# Patient Record
Sex: FEMALE | Race: WHITE | URBAN - METROPOLITAN AREA
[De-identification: names, ages, dates, MRNs, and addresses within clinical notes are randomized per-mention and may not be internally consistent; named-entity substitution may affect disease eponyms.]

---

## 2022-03-25 PROBLEM — Z34.81 MULTIGRAVIDA IN FIRST TRIMESTER: Status: ACTIVE | Noted: 2022-03-25

## 2022-03-25 PROBLEM — O09.891 H/O PRETERM DELIVERY, CURRENTLY PREGNANT, FIRST TRIMESTER: Status: ACTIVE | Noted: 2022-03-25

## 2022-03-29 PROBLEM — Z28.39 RUBELLA NON-IMMUNE STATUS, ANTEPARTUM: Status: ACTIVE | Noted: 2022-03-29

## 2022-03-29 PROBLEM — O09.899 RUBELLA NON-IMMUNE STATUS, ANTEPARTUM: Status: ACTIVE | Noted: 2022-03-29

## 2022-04-25 PROBLEM — O09.892 HISTORY OF PRETERM DELIVERY, CURRENTLY PREGNANT IN SECOND TRIMESTER: Status: ACTIVE | Noted: 2022-03-25

## 2022-04-25 PROBLEM — Z34.82 MULTIGRAVIDA IN SECOND TRIMESTER: Status: ACTIVE | Noted: 2022-03-25

## 2022-05-05 PROBLEM — O28.5 ABNORMAL GENETIC TEST DURING PREGNANCY: Status: ACTIVE | Noted: 2022-05-05

## 2022-05-23 ENCOUNTER — ROUTINE PRENATAL (OUTPATIENT)
Dept: OBGYN CLINIC | Age: 27
End: 2022-05-23

## 2022-05-23 VITALS
BODY MASS INDEX: 20.39 KG/M2 | WEIGHT: 122.4 LBS | DIASTOLIC BLOOD PRESSURE: 72 MMHG | TEMPERATURE: 98.8 F | HEIGHT: 65 IN | HEART RATE: 93 BPM | OXYGEN SATURATION: 98 % | SYSTOLIC BLOOD PRESSURE: 122 MMHG

## 2022-05-23 DIAGNOSIS — Z14.8 CARRIER OF SPINAL MUSCULAR ATROPHY: ICD-10-CM

## 2022-05-23 DIAGNOSIS — Z28.39 RUBELLA NON-IMMUNE STATUS, ANTEPARTUM: ICD-10-CM

## 2022-05-23 DIAGNOSIS — O09.892 HISTORY OF PRETERM DELIVERY, CURRENTLY PREGNANT IN SECOND TRIMESTER: ICD-10-CM

## 2022-05-23 DIAGNOSIS — O09.899 RUBELLA NON-IMMUNE STATUS, ANTEPARTUM: ICD-10-CM

## 2022-05-23 DIAGNOSIS — Z34.82 MULTIGRAVIDA IN SECOND TRIMESTER: ICD-10-CM

## 2022-05-23 PROCEDURE — 99213 OFFICE O/P EST LOW 20 MIN: CPT | Performed by: OBSTETRICS & GYNECOLOGY

## 2022-05-23 NOTE — PROGRESS NOTES
Chief Complaint   Patient presents with    Routine Prenatal Visit        This 27 y.o. Dougie Núñez at 20w0d with Estimated Date of Delivery: 10/10/22 presents for routine prenatal visit. Patient has some complaints today. Pt reports good FM, no LOF, VB, ctx. Pt denies H/A, vision changes, abdom pain, N/V. Patient reports 24 hours of all over malaise and body aches. Denies any fever or chills. 25month-old infant had viral illness last week. Recommended patient go to urgent care for flu and COVID swabs. Over-the-counter medication for relief    Vitals:    22 1442   BP: 122/72   Site: Left Upper Arm   Position: Sitting   Pulse: 93   Temp: 98.8 °F (37.1 °C)   SpO2: 98%   Weight: 122 lb 6.4 oz (55.5 kg)   Height: 5' 5\" (1.651 m)        Patient Active Problem List    Diagnosis Date Noted    Carrier of spinal muscular atrophy 2022     Overview Note:     2022: Offered FOB testing and genetic counseling         Assessment & Plan Note:     noted      Rubella non-immune status, antepartum 2022     Overview Note:     imm PP         Assessment & Plan Note:     noted      Multigravida in second trimester 2022     Overview Note:     EDC by LMP confirmed by 11 5/7 week US    2022: CF negative, NIPT wnl (neg x 3, female)         Assessment & Plan Note:     Educated patient of signs and symptoms of  labor including but not limited to regular uterine contractions every 5-7 minutes for 1 hour, vaginal bleeding or leakage of fluid to seek immediate care.  History of  delivery, currently pregnant in second trimester 2022     Overview Note:     G1  at 31 3/7 weeks    PLAN: routine CL starting at 16 weeks.  17-OHP at 16 - 36 weeks    22:  CL 3.1 cm, no funneling         Assessment & Plan Note:     noted         Problem List Items Addressed This Visit        Other    Rubella non-immune status, antepartum     noted         Multigravida in second trimester     Educated patient of signs and symptoms of  labor including but not limited to regular uterine contractions every 5-7 minutes for 1 hour, vaginal bleeding or leakage of fluid to seek immediate care.            History of  delivery, currently pregnant in second trimester     noted          Carrier of spinal muscular atrophy     noted                Tamy Mathur MD     3:11 PM    22

## 2022-05-23 NOTE — PATIENT INSTRUCTIONS
Please go to urgent care and have them swab you for flu and COVID  If you develop signs and symptoms of  labor including but not limited to regular uterine contractions every 5-7 minutes for 1 hour, vaginal bleeding or leakage of fluid please contact our office and/or seek immediate care. Thanks for coming to see us today and letting us take care of you!

## 2022-05-23 NOTE — PROGRESS NOTES
Patient comes in today for routine prenatal visit. Patient says that yesterday she started to experience stomach soreness like she bumped her belly. She says that she did not. She also mentions her back being achy and sore. She says she woke up this morning and her entire body was sore and she is still sore all over like all her joints and muscles.      Fetal Movements:  Yes  Contractions:  No  Vaginal Bleeding:  No  Leaking Fluid: No  GI/ issues: No    Vitals:    05/23/22 1442   BP: 122/72   Site: Left Upper Arm   Position: Sitting   Weight: 122 lb 6.4 oz (55.5 kg)   Height: 5' 5\" (1.651 m)           Poli Sanches MA  05/23/22  2:48 PM

## 2022-05-23 NOTE — ASSESSMENT & PLAN NOTE
Educated patient of signs and symptoms of  labor including but not limited to regular uterine contractions every 5-7 minutes for 1 hour, vaginal bleeding or leakage of fluid to seek immediate care.

## 2022-05-27 ENCOUNTER — TELEPHONE (OUTPATIENT)
Dept: OBGYN CLINIC | Age: 27
End: 2022-05-27

## 2022-05-27 NOTE — TELEPHONE ENCOUNTER
[employer] is the product that the patient is asking if it is safe to take while pregnant? She asks this because she feels like she needs a boost of energy in the middle of the day.

## 2022-05-31 ENCOUNTER — ROUTINE PRENATAL (OUTPATIENT)
Dept: OBGYN CLINIC | Age: 27
End: 2022-05-31
Payer: COMMERCIAL

## 2022-05-31 VITALS
SYSTOLIC BLOOD PRESSURE: 104 MMHG | WEIGHT: 123.8 LBS | HEIGHT: 65 IN | BODY MASS INDEX: 20.62 KG/M2 | DIASTOLIC BLOOD PRESSURE: 68 MMHG

## 2022-05-31 DIAGNOSIS — O09.899 RUBELLA NON-IMMUNE STATUS, ANTEPARTUM: ICD-10-CM

## 2022-05-31 DIAGNOSIS — R39.15 URINARY URGENCY: ICD-10-CM

## 2022-05-31 DIAGNOSIS — Z28.39 RUBELLA NON-IMMUNE STATUS, ANTEPARTUM: ICD-10-CM

## 2022-05-31 DIAGNOSIS — O09.892 HISTORY OF PRETERM DELIVERY, CURRENTLY PREGNANT IN SECOND TRIMESTER: ICD-10-CM

## 2022-05-31 DIAGNOSIS — Z36.89 ENCOUNTER FOR FETAL ANATOMIC SURVEY: Primary | ICD-10-CM

## 2022-05-31 DIAGNOSIS — Z34.82 MULTIGRAVIDA IN SECOND TRIMESTER: ICD-10-CM

## 2022-05-31 DIAGNOSIS — Z14.8 CARRIER OF SPINAL MUSCULAR ATROPHY: ICD-10-CM

## 2022-05-31 LAB
BILIRUBIN, URINE, POC: NEGATIVE
GLUCOSE URINE, POC: NEGATIVE
KETONES, URINE, POC: NEGATIVE
LEUKOCYTE ESTERASE, URINE, POC: NORMAL
NITRITE, URINE, POC: NEGATIVE
PH, URINE, POC: 6.5 (ref 4.6–8)
PROTEIN,URINE, POC: NEGATIVE
SPECIFIC GRAVITY, URINE, POC: 1.01 (ref 1–1.03)
URINALYSIS CLARITY, POC: CLEAR
URINALYSIS COLOR, POC: YELLOW

## 2022-05-31 PROCEDURE — 76817 TRANSVAGINAL US OBSTETRIC: CPT | Performed by: OBSTETRICS & GYNECOLOGY

## 2022-05-31 PROCEDURE — 81002 URINALYSIS NONAUTO W/O SCOPE: CPT | Performed by: OBSTETRICS & GYNECOLOGY

## 2022-05-31 PROCEDURE — 99213 OFFICE O/P EST LOW 20 MIN: CPT | Performed by: OBSTETRICS & GYNECOLOGY

## 2022-05-31 PROCEDURE — 76816 OB US FOLLOW-UP PER FETUS: CPT | Performed by: OBSTETRICS & GYNECOLOGY

## 2022-05-31 NOTE — PROGRESS NOTES
Patient comes in today for routine prenatal visit. No complaints/concerns today.     Fetal Movements:  Yes  Contractions:  No  Vaginal Bleeding:  No  Leaking Fluid: No  GI/ issues: No    Vitals:    05/31/22 1504   BP: 104/68   Site: Left Upper Arm   Position: Sitting   Weight: 123 lb 12.8 oz (56.2 kg)   Height: 5' 5\" (1.651 m)           Cristela Shook MA  05/31/22  3:07 PM

## 2022-05-31 NOTE — PROGRESS NOTES
Chief Complaint   Patient presents with    Routine Prenatal Visit        This 27 y.o. Farhad Bless at 21w1d with Estimated Date of Delivery: 10/10/22 presents for routine prenatal visit. Patient has no complaints today. Pt reports good FM, no LOF, VB, ctx. Pt denies H/A, vision changes, abdom pain, N/V. Vitals:    22 1504   BP: 104/68   Site: Left Upper Arm   Position: Sitting   Weight: 123 lb 12.8 oz (56.2 kg)   Height: 5' 5\" (1.651 m)        Patient Active Problem List    Diagnosis Date Noted    Carrier of spinal muscular atrophy 2022     Overview Note:     2022: Offered FOB testing and genetic counseling         Assessment & Plan Note:     noted      Rubella non-immune status, antepartum 2022     Overview Note:     imm PP         Assessment & Plan Note:     noted      Multigravida in second trimester 2022     Overview Note:     EDC by LMP confirmed by 11 5/7 week US    2022: CF negative, NIPT wnl (neg x 3, female)         Assessment & Plan Note:     Educated patient of signs and symptoms of  labor including but not limited to regular uterine contractions every 5-7 minutes for 1 hour, vaginal bleeding or leakage of fluid to seek immediate care.  History of  delivery, currently pregnant in second trimester 2022     Overview Note:     G1  at 31 3/7 weeks    PLAN: routine CL starting at 16 weeks. 17-OHP at 16 - 36 weeks    22:  CL 3.1 cm, no funneling  22:  CL 3.2 cm, no funneling         Assessment & Plan Note:     noted        Problem List Items Addressed This Visit        Other    Rubella non-immune status, antepartum     noted         Multigravida in second trimester     Educated patient of signs and symptoms of  labor including but not limited to regular uterine contractions every 5-7 minutes for 1 hour, vaginal bleeding or leakage of fluid to seek immediate care.           Relevant Orders    AMB POC US OB RE-EVAL/FOLLOW UP (Completed)    AMB POC US OB TRANSVAGINAL (Completed)    History of  delivery, currently pregnant in second trimester     noted         Relevant Orders    AMB POC US OB RE-EVAL/FOLLOW UP (Completed)    AMB POC US OB TRANSVAGINAL (Completed)    Carrier of spinal muscular atrophy     noted         Relevant Orders    AMB POC US OB RE-EVAL/FOLLOW UP (Completed)    AMB POC US OB TRANSVAGINAL (Completed)      Other Visit Diagnoses     Encounter for fetal anatomic survey    -  Primary    Relevant Orders    AMB POC US OB RE-EVAL/FOLLOW UP (Completed)    AMB POC US OB TRANSVAGINAL (Completed)           Simi Farmer MD     3:15 PM    22

## 2022-05-31 NOTE — PATIENT INSTRUCTIONS
If you develop signs and symptoms of  labor including but not limited to regular uterine contractions every 5-7 minutes for 1 hour, vaginal bleeding or leakage of fluid please contact our office and/or seek immediate care. Thanks for coming to see us today and letting us take care of you!

## 2022-06-02 LAB
BACTERIA SPEC CULT: NORMAL
BACTERIA SPEC CULT: NORMAL
SERVICE CMNT-IMP: NORMAL

## 2022-06-09 ENCOUNTER — TELEPHONE (OUTPATIENT)
Dept: OBGYN CLINIC | Age: 27
End: 2022-06-09

## 2022-06-09 NOTE — TELEPHONE ENCOUNTER
Patient LM stating her due date is 10/10/22 and her family has a trip planned 10/01-10/07. She stated she just wants the baby to come when it comes. Patient states she wants to know the rule of thumb for traveling while pregnant. I called the patient to address her concerns. Patient was given the information from the prenatal booklet. Patient voiced understanding.  han

## 2022-07-05 ENCOUNTER — ROUTINE PRENATAL (OUTPATIENT)
Dept: OBGYN CLINIC | Age: 27
End: 2022-07-05
Payer: MEDICAID

## 2022-07-05 VITALS
HEIGHT: 65 IN | BODY MASS INDEX: 20.66 KG/M2 | DIASTOLIC BLOOD PRESSURE: 70 MMHG | SYSTOLIC BLOOD PRESSURE: 112 MMHG | WEIGHT: 124 LBS

## 2022-07-05 DIAGNOSIS — Z34.83 MULTIGRAVIDA IN THIRD TRIMESTER: ICD-10-CM

## 2022-07-05 DIAGNOSIS — Z28.39 RUBELLA NON-IMMUNE STATUS, ANTEPARTUM: ICD-10-CM

## 2022-07-05 DIAGNOSIS — O09.892 HISTORY OF PRETERM DELIVERY, CURRENTLY PREGNANT IN SECOND TRIMESTER: ICD-10-CM

## 2022-07-05 DIAGNOSIS — O09.899 RUBELLA NON-IMMUNE STATUS, ANTEPARTUM: ICD-10-CM

## 2022-07-05 DIAGNOSIS — Z14.8 CARRIER OF SPINAL MUSCULAR ATROPHY: ICD-10-CM

## 2022-07-05 PROCEDURE — 99213 OFFICE O/P EST LOW 20 MIN: CPT | Performed by: OBSTETRICS & GYNECOLOGY

## 2022-07-05 ASSESSMENT — PATIENT HEALTH QUESTIONNAIRE - PHQ9
2. FEELING DOWN, DEPRESSED OR HOPELESS: 0
SUM OF ALL RESPONSES TO PHQ QUESTIONS 1-9: 0
SUM OF ALL RESPONSES TO PHQ9 QUESTIONS 1 & 2: 0
SUM OF ALL RESPONSES TO PHQ QUESTIONS 1-9: 0
1. LITTLE INTEREST OR PLEASURE IN DOING THINGS: 0
SUM OF ALL RESPONSES TO PHQ QUESTIONS 1-9: 0
SUM OF ALL RESPONSES TO PHQ QUESTIONS 1-9: 0

## 2022-07-05 NOTE — PROGRESS NOTES
Chief Complaint   Patient presents with    Routine Prenatal Visit        This 24 y.o. Valri Pillion at 26w1d with Estimated Date of Delivery: 10/10/22 presents for routine prenatal visit. Patient has no complaints today. Pt reports good FM, no LOF, VB, ctx. Pt denies H/A, vision changes, abdom pain, N/V. Vitals:    22 0926   BP: 112/70   Site: Left Upper Arm   Position: Sitting   Weight: 124 lb (56.2 kg)   Height: 5' 5\" (1.651 m)        Patient Active Problem List    Diagnosis Date Noted    Carrier of spinal muscular atrophy 2022     Overview Note:     2022: Offered FOB testing and genetic counseling         Assessment & Plan Note:     noted      Rubella non-immune status, antepartum 2022     Overview Note:     imm PP         Assessment & Plan Note:     noted      Multigravida in third trimester 2022     Overview Note:     EDC by LMP confirmed by 11 5/7 week US    2022: CF/AFP negative, NIPT wnl (neg x 3, female)         Assessment & Plan Note:     Educated patient of signs and symptoms of  labor including but not limited to regular uterine contractions every 5-7 minutes for 1 hour, vaginal bleeding or leakage of fluid to seek immediate care.  History of  delivery, currently pregnant in second trimester 2022     Overview Note:     G1  at 31 3/7 weeks    PLAN: routine CL starting at 16 weeks. 17-OHP at 16 - 36 weeks    22:  CL 3.1 cm, no funneling  22:  CL 3.2 cm, no funneling         Assessment & Plan Note:     noted        Problem List Items Addressed This Visit        Other    Rubella non-immune status, antepartum     noted         Multigravida in third trimester     Educated patient of signs and symptoms of  labor including but not limited to regular uterine contractions every 5-7 minutes for 1 hour, vaginal bleeding or leakage of fluid to seek immediate care.           History of  delivery, currently pregnant in second trimester     noted         Carrier of spinal muscular atrophy     noted                Carissa Healy MD     9:30 AM    07/05/22

## 2022-07-05 NOTE — PROGRESS NOTES
Patient comes in today for routine prenatal visit. No complaints/concerns today.      Fetal Movement: Yes  Contractions: No  Vaginal Bleeding: No  Leaking Fluid: No  GI/: No    Vitals:    07/05/22 0926   BP: 112/70   Site: Left Upper Arm   Position: Sitting   Weight: 124 lb (56.2 kg)   Height: 5' 5\" (1.651 m)

## 2022-07-21 ENCOUNTER — ROUTINE PRENATAL (OUTPATIENT)
Dept: OBGYN CLINIC | Age: 27
End: 2022-07-21
Payer: MEDICAID

## 2022-07-21 VITALS
DIASTOLIC BLOOD PRESSURE: 72 MMHG | WEIGHT: 135 LBS | BODY MASS INDEX: 22.49 KG/M2 | SYSTOLIC BLOOD PRESSURE: 122 MMHG | HEIGHT: 65 IN

## 2022-07-21 DIAGNOSIS — Z14.8 CARRIER OF SPINAL MUSCULAR ATROPHY: ICD-10-CM

## 2022-07-21 DIAGNOSIS — O09.899 RUBELLA NON-IMMUNE STATUS, ANTEPARTUM: ICD-10-CM

## 2022-07-21 DIAGNOSIS — Z34.83 MULTIGRAVIDA IN THIRD TRIMESTER: ICD-10-CM

## 2022-07-21 DIAGNOSIS — O09.893 HISTORY OF PRETERM DELIVERY, CURRENTLY PREGNANT IN THIRD TRIMESTER: ICD-10-CM

## 2022-07-21 DIAGNOSIS — O09.892 HISTORY OF PRETERM DELIVERY, CURRENTLY PREGNANT IN SECOND TRIMESTER: ICD-10-CM

## 2022-07-21 DIAGNOSIS — Z28.39 RUBELLA NON-IMMUNE STATUS, ANTEPARTUM: ICD-10-CM

## 2022-07-21 DIAGNOSIS — Z34.83 MULTIGRAVIDA IN THIRD TRIMESTER: Primary | ICD-10-CM

## 2022-07-21 LAB
BASOPHILS # BLD: 0.1 K/UL (ref 0–0.2)
BASOPHILS NFR BLD: 1 % (ref 0–2)
DIFFERENTIAL METHOD BLD: NORMAL
EOSINOPHIL # BLD: 0.3 K/UL (ref 0–0.8)
EOSINOPHIL NFR BLD: 3 % (ref 0.5–7.8)
ERYTHROCYTE [DISTWIDTH] IN BLOOD BY AUTOMATED COUNT: 12.3 % (ref 11.9–14.6)
GLUCOSE 1 HOUR: 77 MG/DL
HCT VFR BLD AUTO: 39.4 % (ref 35.8–46.3)
HGB BLD-MCNC: 12.8 G/DL (ref 11.7–15.4)
IMM GRANULOCYTES # BLD AUTO: 0 K/UL (ref 0–0.5)
IMM GRANULOCYTES NFR BLD AUTO: 0 % (ref 0–5)
LYMPHOCYTES # BLD: 2.1 K/UL (ref 0.5–4.6)
LYMPHOCYTES NFR BLD: 20 % (ref 13–44)
MCH RBC QN AUTO: 30.3 PG (ref 26.1–32.9)
MCHC RBC AUTO-ENTMCNC: 32.5 G/DL (ref 31.4–35)
MCV RBC AUTO: 93.4 FL (ref 79.6–97.8)
MONOCYTES # BLD: 0.7 K/UL (ref 0.1–1.3)
MONOCYTES NFR BLD: 7 % (ref 4–12)
NEUTS SEG # BLD: 7.3 K/UL (ref 1.7–8.2)
NEUTS SEG NFR BLD: 69 % (ref 43–78)
NRBC # BLD: 0 K/UL (ref 0–0.2)
PLATELET # BLD AUTO: 332 K/UL (ref 150–450)
PMV BLD AUTO: 9.7 FL (ref 9.4–12.3)
RBC # BLD AUTO: 4.22 M/UL (ref 4.05–5.2)
WBC # BLD AUTO: 10.5 K/UL (ref 4.3–11.1)

## 2022-07-21 PROCEDURE — 76816 OB US FOLLOW-UP PER FETUS: CPT | Performed by: OBSTETRICS & GYNECOLOGY

## 2022-07-21 PROCEDURE — 76817 TRANSVAGINAL US OBSTETRIC: CPT | Performed by: OBSTETRICS & GYNECOLOGY

## 2022-07-21 PROCEDURE — 99213 OFFICE O/P EST LOW 20 MIN: CPT | Performed by: OBSTETRICS & GYNECOLOGY

## 2022-07-21 ASSESSMENT — PATIENT HEALTH QUESTIONNAIRE - PHQ9
SUM OF ALL RESPONSES TO PHQ QUESTIONS 1-9: 0
1. LITTLE INTEREST OR PLEASURE IN DOING THINGS: 0
SUM OF ALL RESPONSES TO PHQ QUESTIONS 1-9: 0
SUM OF ALL RESPONSES TO PHQ QUESTIONS 1-9: 0
2. FEELING DOWN, DEPRESSED OR HOPELESS: 0
SUM OF ALL RESPONSES TO PHQ QUESTIONS 1-9: 0
SUM OF ALL RESPONSES TO PHQ9 QUESTIONS 1 & 2: 0

## 2022-07-21 NOTE — PATIENT INSTRUCTIONS
We will call you if anything is abnormal from your testing today. If you develop signs and symptoms of  labor including but not limited to regular uterine contractions every 5-7 minutes for 1 hour, vaginal bleeding or leakage of fluid please contact our office and/or seek immediate care. Thanks for coming to see us today and letting us take care of you!

## 2022-07-21 NOTE — PROGRESS NOTES
Patient comes in today for routine prenatal visit. Patient would like to discuss if [address] [place] [address] offers a .       Finished Glucola @ 10:41 am     Fetal Movement: Yes  Contractions: No  Vaginal Bleeding: No  Leaking Fluid: No  GI/: No    Vitals:    07/21/22 1108   BP: 122/72   Site: Left Upper Arm   Position: Sitting   Weight: 135 lb (61.2 kg)   Height: 5' 5\" (1.651 m)

## 2022-08-02 ENCOUNTER — ROUTINE PRENATAL (OUTPATIENT)
Dept: OBGYN CLINIC | Age: 27
End: 2022-08-02
Payer: COMMERCIAL

## 2022-08-02 ENCOUNTER — HOSPITAL ENCOUNTER (OUTPATIENT)
Age: 27
Setting detail: RECURRING SERIES
Discharge: HOME | End: 2022-08-05
Payer: COMMERCIAL

## 2022-08-02 VITALS
WEIGHT: 138 LBS | DIASTOLIC BLOOD PRESSURE: 72 MMHG | BODY MASS INDEX: 22.99 KG/M2 | SYSTOLIC BLOOD PRESSURE: 116 MMHG | HEIGHT: 65 IN

## 2022-08-02 DIAGNOSIS — Z14.8 CARRIER OF SPINAL MUSCULAR ATROPHY: ICD-10-CM

## 2022-08-02 DIAGNOSIS — Z34.83 MULTIGRAVIDA IN THIRD TRIMESTER: Primary | ICD-10-CM

## 2022-08-02 DIAGNOSIS — O26.893 VAGINAL DISCHARGE DURING PREGNANCY IN THIRD TRIMESTER: ICD-10-CM

## 2022-08-02 DIAGNOSIS — O09.899 RUBELLA NON-IMMUNE STATUS, ANTEPARTUM: ICD-10-CM

## 2022-08-02 DIAGNOSIS — O47.03 THREATENED PREMATURE LABOR IN THIRD TRIMESTER: ICD-10-CM

## 2022-08-02 DIAGNOSIS — O09.893 HISTORY OF PRETERM DELIVERY, CURRENTLY PREGNANT IN THIRD TRIMESTER: ICD-10-CM

## 2022-08-02 DIAGNOSIS — N89.8 VAGINAL DISCHARGE DURING PREGNANCY IN THIRD TRIMESTER: ICD-10-CM

## 2022-08-02 DIAGNOSIS — Z28.39 RUBELLA NON-IMMUNE STATUS, ANTEPARTUM: ICD-10-CM

## 2022-08-02 PROCEDURE — 82731 ASSAY OF FETAL FIBRONECTIN: CPT

## 2022-08-02 PROCEDURE — 99213 OFFICE O/P EST LOW 20 MIN: CPT | Performed by: OBSTETRICS & GYNECOLOGY

## 2022-08-02 NOTE — PROGRESS NOTES
Chief Complaint   Patient presents with    Routine Prenatal Visit        This 24 y.o. Paolo Wright at 30w1d with Estimated Date of Delivery: 10/10/22 presents for routine prenatal visit. Patient has some complaints today. Pt reports good FM, no LOF, VB, (+) ctx. Pt denies H/A, vision changes, abdom pain, N/V. (+) vag D/C. Pelvic - minimal D/C noted, cx appears closed    Vitals:    22 1153   BP: 116/72   Site: Left Upper Arm   Position: Sitting   Weight: 138 lb (62.6 kg)   Height: 5' 5\" (1.651 m)        Patient Active Problem List    Diagnosis Date Noted    Vaginal discharge during pregnancy in third trimester 2022     Priority: Medium     Overview Note:     noted      Threatened premature labor in third trimester 2022     Priority: Medium     Overview Note:     noted       Assessment & Plan Note:     FFN done today  CX appears closed on exam      Carrier of spinal muscular atrophy 2022     Overview Note:     2022: Offered FOB testing and genetic counseling         Assessment & Plan Note:     noted      Rubella non-immune status, antepartum 2022     Overview Note:     imm PP         Assessment & Plan Note:     noted      Multigravida in third trimester 2022     Overview Note:     EDC by LMP confirmed by 11 5/7 week US    2022: CF/AFP negative, NIPT wnl (neg x 3, female), AFP only neg    22:  EFW 59%, AC 60%, MIKAYLA 18.0 cm, vtx       Assessment & Plan Note:     Educated patient of signs and symptoms of  labor including but not limited to regular uterine contractions every 5-7 minutes for 1 hour, vaginal bleeding or leakage of fluid to seek immediate care.  History of  delivery, currently pregnant in third trimester 2022     Overview Note:     G1  at 31 3/7 weeks    PLAN: routine CL starting at 16 weeks.  17-OHP at 16 - 36 weeks    22:  CL 3.1 cm, no funneling  22:  CL 3.2 cm, no funneling  22:  CL 3.0 cm, no funneling Assessment & Plan Note:     noted        Problem List Items Addressed This Visit        Other    Vaginal discharge during pregnancy in third trimester    Relevant Orders    AMB POC URINALYSIS DIP STICK AUTO W/ MICRO    Culture, Urine    Nuswab Vaginitis Plus (VG+)    Threatened premature labor in third trimester     FFN done today  CX appears closed on exam         Relevant Orders    Fetal Fibronectin    Rubella non-immune status, antepartum     noted         Multigravida in third trimester - Primary     Educated patient of signs and symptoms of  labor including but not limited to regular uterine contractions every 5-7 minutes for 1 hour, vaginal bleeding or leakage of fluid to seek immediate care.           Relevant Orders    AMB POC URINALYSIS DIP STICK AUTO W/ MICRO    Culture, Urine    History of  delivery, currently pregnant in third trimester     noted         Carrier of spinal muscular atrophy     noted             Mary Ellis MD     12:17 PM    22

## 2022-08-02 NOTE — PROGRESS NOTES
Patient comes in today for routine prenatal visit. Patient c/o vaginal discharge that has an odor associated with it.      Fetal Movements:  Yes  Contractions:  Yes garry diallo  Vaginal Bleeding:  No  Leaking Fluid: No  GI/ issues: No    Vitals:    08/02/22 1153   BP: 116/72   Site: Left Upper Arm   Position: Sitting   Weight: 138 lb (62.6 kg)   Height: 5' 5\" (1.651 m)           Bruce Castro MA  08/02/22  11:58 AM

## 2022-08-03 LAB — FIBRONECTIN FETAL VAG QL: NEGATIVE

## 2022-08-04 LAB
BACTERIA SPEC CULT: NORMAL
BACTERIA SPEC CULT: NORMAL
SERVICE CMNT-IMP: NORMAL

## 2022-08-04 NOTE — RESULT ENCOUNTER NOTE
Called patient to give her her results at 1:21pm. She did not answer. I left a voicemail with no details.

## 2022-08-05 NOTE — RESULT ENCOUNTER NOTE
Patient called back this morning. She was informed that her  labor test came back negative. She verbalizes understanding. She also asks if she should expect to feel some discomfort after being adjusted by the chiropractor. She was advised to take some tylenol before and after her appointment to help with the discomfort from the adjusting. She said baby continues to move well, no LOF, and no bleeding.

## 2022-08-07 LAB
A VAGINAE DNA VAG QL NAA+PROBE: NORMAL SCORE
BVAB2 DNA VAG QL NAA+PROBE: NORMAL SCORE
C ALBICANS DNA VAG QL NAA+PROBE: NEGATIVE
C GLABRATA DNA VAG QL NAA+PROBE: NEGATIVE
C TRACH RRNA SPEC QL NAA+PROBE: NEGATIVE
MEGA1 DNA VAG QL NAA+PROBE: NORMAL SCORE
N GONORRHOEA RRNA SPEC QL NAA+PROBE: NEGATIVE
SPECIMEN SOURCE: NORMAL
T VAGINALIS RRNA SPEC QL NAA+PROBE: NEGATIVE

## 2022-08-09 ENCOUNTER — ROUTINE PRENATAL (OUTPATIENT)
Dept: OBGYN CLINIC | Age: 27
End: 2022-08-09
Payer: COMMERCIAL

## 2022-08-09 VITALS
BODY MASS INDEX: 23.39 KG/M2 | DIASTOLIC BLOOD PRESSURE: 72 MMHG | WEIGHT: 140.4 LBS | SYSTOLIC BLOOD PRESSURE: 116 MMHG | HEIGHT: 65 IN

## 2022-08-09 DIAGNOSIS — O09.893 HISTORY OF PRETERM DELIVERY, CURRENTLY PREGNANT IN THIRD TRIMESTER: ICD-10-CM

## 2022-08-09 DIAGNOSIS — O26.843 UTERINE SIZE DATE DISCREPANCY PREGNANCY, THIRD TRIMESTER: Primary | ICD-10-CM

## 2022-08-09 DIAGNOSIS — Z28.39 RUBELLA NON-IMMUNE STATUS, ANTEPARTUM: ICD-10-CM

## 2022-08-09 DIAGNOSIS — O47.03 THREATENED PREMATURE LABOR IN THIRD TRIMESTER: ICD-10-CM

## 2022-08-09 DIAGNOSIS — O09.899 RUBELLA NON-IMMUNE STATUS, ANTEPARTUM: ICD-10-CM

## 2022-08-09 DIAGNOSIS — Z34.83 MULTIGRAVIDA IN THIRD TRIMESTER: ICD-10-CM

## 2022-08-09 DIAGNOSIS — Z14.8 CARRIER OF SPINAL MUSCULAR ATROPHY: ICD-10-CM

## 2022-08-09 PROCEDURE — 99213 OFFICE O/P EST LOW 20 MIN: CPT | Performed by: OBSTETRICS & GYNECOLOGY

## 2022-08-09 PROCEDURE — 76817 TRANSVAGINAL US OBSTETRIC: CPT | Performed by: OBSTETRICS & GYNECOLOGY

## 2022-08-09 PROCEDURE — 76816 OB US FOLLOW-UP PER FETUS: CPT | Performed by: OBSTETRICS & GYNECOLOGY

## 2022-08-09 NOTE — PROGRESS NOTES
Chief Complaint   Patient presents with    Routine Prenatal Visit        This 24 y.o. Torey Law at 27w3d with Estimated Date of Delivery: 10/10/22 presents for routine prenatal visit. Patient has no complaints today. Pt reports good FM, no LOF, VB, ctx. Pt denies H/A, vision changes, abdom pain, N/V. Vitals:    22 1327   BP: 116/72   Site: Left Upper Arm   Position: Sitting   Weight: 140 lb 6.4 oz (63.7 kg)   Height: 5' 5\" (1.651 m)        Patient Active Problem List    Diagnosis Date Noted    Threatened premature labor in third trimester 2022     Priority: Medium     Overview Note:     22:  FFN neg  22:  CL 3.2 cm, no funneling         Assessment & Plan Note:     noted      Carrier of spinal muscular atrophy 2022     Overview Note:     2022: Offered FOB testing and genetic counseling         Assessment & Plan Note:     noted      Rubella non-immune status, antepartum 2022     Overview Note:     imm PP         Assessment & Plan Note:     noted      Multigravida in third trimester 2022     Overview Note:     EDC by LMP confirmed by 11 5/7 week US    2022: CF/AFP negative, NIPT wnl (neg x 3, female), AFP only neg    22:  EFW 59%, AC 60%, MIKAYLA 18.0 cm, vtx  22:  EFW 26%, AC 32%, MIKAYLA 19.7 cm, CL 3.2 cm with NO funneling, Vtx      History of  delivery, currently pregnant in third trimester 2022     Overview Note:     G1  at 31 3/7 weeks    PLAN: routine CL starting at 16 weeks.  17-OHP at 16 - 36 weeks    22:  CL 3.1 cm, no funneling  22:  CL 3.2 cm, no funneling  22:  CL 3.0 cm, no funneling  22:  CL 3.2 cm, no funneling         Assessment & Plan Note:     noted        Problem List Items Addressed This Visit        Other    Threatened premature labor in third trimester     noted         Rubella non-immune status, antepartum     noted         Multigravida in third trimester    Relevant Orders    AMB POC US OB RE-EVAL/FOLLOW UP (Completed)    AMB POC US OB TRANSVAGINAL (Completed)    History of  delivery, currently pregnant in third trimester     noted         Relevant Orders    AMB POC US OB RE-EVAL/FOLLOW UP (Completed)    AMB POC US OB TRANSVAGINAL (Completed)    Carrier of spinal muscular atrophy     noted         Relevant Orders    AMB POC US OB RE-EVAL/FOLLOW UP (Completed)    AMB POC US OB TRANSVAGINAL (Completed)   Other Visit Diagnoses     Uterine size date discrepancy pregnancy, third trimester    -  Primary    Relevant Orders    AMB POC US OB RE-EVAL/FOLLOW UP (Completed)    AMB POC US OB TRANSVAGINAL (Completed)           Dominic Nagel MD     1:35 PM    22

## 2022-08-09 NOTE — PROGRESS NOTES
Patient comes in today for routine prenatal visit. No complaints/concerns today.     Fetal Movements:  Yes  Contractions:  Yes garry diallo  Vaginal Bleeding:  No  Leaking Fluid: No  GI/ issues: No    Vitals:    08/09/22 1327   BP: 116/72   Site: Left Upper Arm   Position: Sitting   Weight: 140 lb 6.4 oz (63.7 kg)   Height: 5' 5\" (1.651 m)           Marzena Howard MA  08/09/22  1:30 PM

## 2022-08-11 ENCOUNTER — TELEPHONE (OUTPATIENT)
Dept: OBGYN CLINIC | Age: 27
End: 2022-08-11

## 2022-08-11 NOTE — TELEPHONE ENCOUNTER
Patient calls stating she feels extremely anxious because she is 31 weeks 3 days and this is when she had her first baby. She says she was driving and felt lightheaded so she pulled over. She mentions also feeling very nauseous. She states, \"I think I am freaking myself out because this is when I had my son\". I called the patient to assure her hte anxiety was normal. However, if baby is doing well as she stated baby was, then today is the day to celebrate a win. Patient agreed and said she would lie down. She does feel better, but will go be seen if things change.

## 2022-08-23 ENCOUNTER — ROUTINE PRENATAL (OUTPATIENT)
Dept: OBGYN CLINIC | Age: 27
End: 2022-08-23
Payer: COMMERCIAL

## 2022-08-23 VITALS
BODY MASS INDEX: 23.66 KG/M2 | HEIGHT: 65 IN | SYSTOLIC BLOOD PRESSURE: 128 MMHG | DIASTOLIC BLOOD PRESSURE: 72 MMHG | WEIGHT: 142 LBS

## 2022-08-23 DIAGNOSIS — O09.899 RUBELLA NON-IMMUNE STATUS, ANTEPARTUM: ICD-10-CM

## 2022-08-23 DIAGNOSIS — Z28.39 RUBELLA NON-IMMUNE STATUS, ANTEPARTUM: ICD-10-CM

## 2022-08-23 DIAGNOSIS — O09.893 HISTORY OF PRETERM DELIVERY, CURRENTLY PREGNANT IN THIRD TRIMESTER: ICD-10-CM

## 2022-08-23 DIAGNOSIS — Z14.8 CARRIER OF SPINAL MUSCULAR ATROPHY: ICD-10-CM

## 2022-08-23 DIAGNOSIS — Z34.83 MULTIGRAVIDA IN THIRD TRIMESTER: Primary | ICD-10-CM

## 2022-08-23 PROCEDURE — 99213 OFFICE O/P EST LOW 20 MIN: CPT | Performed by: OBSTETRICS & GYNECOLOGY

## 2022-08-23 ASSESSMENT — PATIENT HEALTH QUESTIONNAIRE - PHQ9
2. FEELING DOWN, DEPRESSED OR HOPELESS: 0
1. LITTLE INTEREST OR PLEASURE IN DOING THINGS: 0
SUM OF ALL RESPONSES TO PHQ QUESTIONS 1-9: 0
SUM OF ALL RESPONSES TO PHQ9 QUESTIONS 1 & 2: 0
SUM OF ALL RESPONSES TO PHQ QUESTIONS 1-9: 0

## 2022-08-23 NOTE — PROGRESS NOTES
Chief Complaint   Patient presents with    Routine Prenatal Visit        This 24 y.o. Sailaja Vanessa at 33w1d with Estimated Date of Delivery: 10/10/22 presents for routine prenatal visit. Patient has no complaints today. Pt reports good FM, no LOF, VB, ctx. Pt denies H/A, vision changes, abdom pain, N/V. Vitals:    22 0932   BP: 128/72   Site: Left Upper Arm   Position: Sitting   Weight: 142 lb (64.4 kg)   Height: 5' 5\" (1.651 m)        Patient Active Problem List    Diagnosis Date Noted    Threatened premature labor in third trimester 2022     Priority: Medium     Overview Note:     22:  FFN neg  22:  CL 3.2 cm, no funneling        Carrier of spinal muscular atrophy 2022     Overview Note:     2022: Offered FOB testing and genetic counseling         Assessment & Plan Note:     noted      Rubella non-immune status, antepartum 2022     Overview Note:     imm PP         Assessment & Plan Note:     noted      Multigravida in third trimester 2022     Overview Note:     EDC by LMP confirmed by 11 5/7 week US    2022: CF/AFP negative, NIPT wnl (neg x 3, female), AFP only neg    22:  EFW 59%, AC 60%, MIKAYLA 18.0 cm, vtx  22:  EFW 26%, AC 32%, MIKAYLA 19.7 cm, CL 3.2 cm with NO funneling, Vtx       Assessment & Plan Note:     Educated patient of signs and symptoms of  labor including but not limited to regular uterine contractions every 5-7 minutes for 1 hour, vaginal bleeding or leakage of fluid to seek immediate care.  History of  delivery, currently pregnant in third trimester 2022     Overview Note:     G1  at 31 3/7 weeks    PLAN: routine CL starting at 16 weeks.  17-OHP at 16 - 36 weeks    22:  CL 3.1 cm, no funneling  22:  CL 3.2 cm, no funneling  22:  CL 3.0 cm, no funneling  22:  CL 3.2 cm, no funneling         Assessment & Plan Note:     noted        Problem List Items Addressed This Visit        Other    Rubella non-immune status, antepartum     noted         Multigravida in third trimester - Primary     Educated patient of signs and symptoms of  labor including but not limited to regular uterine contractions every 5-7 minutes for 1 hour, vaginal bleeding or leakage of fluid to seek immediate care.           History of  delivery, currently pregnant in third trimester     noted         Carrier of spinal muscular atrophy     noted             Carissa Healy MD     9:43 AM    22

## 2022-08-23 NOTE — PROGRESS NOTES
Patient comes in today for routine prenatal visit. No complaints/concerns today.      Fetal Movement: Yes  Contractions: Yes, [place]  Vaginal Bleeding: No  Leaking Fluid: No  GI/: No    Vitals:    08/23/22 0932   BP: 128/72   Site: Left Upper Arm   Position: Sitting   Weight: 142 lb (64.4 kg)   Height: 5' 5\" (1.651 m)

## 2022-08-30 ENCOUNTER — HOSPITAL ENCOUNTER (OUTPATIENT)
Age: 27
Discharge: HOME | End: 2022-08-30
Attending: OBSTETRICS & GYNECOLOGY | Admitting: OBSTETRICS & GYNECOLOGY
Payer: COMMERCIAL

## 2022-08-30 ENCOUNTER — TELEPHONE (OUTPATIENT)
Dept: OBGYN CLINIC | Age: 27
End: 2022-08-30

## 2022-08-30 PROBLEM — R03.0 ELEVATED BLOOD-PRESSURE READING, WITHOUT DIAGNOSIS OF HYPERTENSION: Status: ACTIVE | Noted: 2022-08-30

## 2022-08-30 LAB
ALBUMIN SERPL-MCNC: 2.7 G/DL (ref 3.5–5)
ALBUMIN/GLOB SERPL: 0.7 {RATIO} (ref 1.2–3.5)
ALP SERPL-CCNC: 105 U/L (ref 50–130)
ALT SERPL-CCNC: 17 U/L (ref 12–65)
ANION GAP SERPL CALC-SCNC: 6 MMOL/L (ref 7–16)
AST SERPL-CCNC: 16 U/L (ref 15–37)
BILIRUB SERPL-MCNC: 0.8 MG/DL (ref 0.2–1.1)
BUN SERPL-MCNC: 8 MG/DL (ref 6–23)
CALCIUM SERPL-MCNC: 8.7 MG/DL (ref 8.3–10.4)
CHLORIDE SERPL-SCNC: 107 MMOL/L (ref 98–107)
CO2 SERPL-SCNC: 23 MMOL/L (ref 21–32)
CREAT SERPL-MCNC: 0.56 MG/DL (ref 0.6–1)
CREAT UR-MCNC: <13 MG/DL
ERYTHROCYTE [DISTWIDTH] IN BLOOD BY AUTOMATED COUNT: 12 % (ref 11.9–14.6)
GLOBULIN SER CALC-MCNC: 4 G/DL (ref 2.3–3.5)
GLUCOSE SERPL-MCNC: 86 MG/DL (ref 65–100)
HCT VFR BLD AUTO: 32.6 % (ref 35.8–46.3)
HGB BLD-MCNC: 11.3 G/DL (ref 11.7–15.4)
MCH RBC QN AUTO: 30 PG (ref 26.1–32.9)
MCHC RBC AUTO-ENTMCNC: 34.7 G/DL (ref 31.4–35)
MCV RBC AUTO: 86.5 FL (ref 79.6–97.8)
NRBC # BLD: 0 K/UL (ref 0–0.2)
PLATELET # BLD AUTO: 300 K/UL (ref 150–450)
PMV BLD AUTO: 9.6 FL (ref 9.4–12.3)
POTASSIUM SERPL-SCNC: 3.5 MMOL/L (ref 3.5–5.1)
PROT SERPL-MCNC: 6.7 G/DL (ref 6.3–8.2)
PROT UR-MCNC: <5 MG/DL
PROT/CREAT UR-RTO: NORMAL
RBC # BLD AUTO: 3.77 M/UL (ref 4.05–5.2)
SODIUM SERPL-SCNC: 136 MMOL/L (ref 136–145)
WBC # BLD AUTO: 12.4 K/UL (ref 4.3–11.1)

## 2022-08-30 PROCEDURE — 99283 EMERGENCY DEPT VISIT LOW MDM: CPT

## 2022-08-30 PROCEDURE — 80053 COMPREHEN METABOLIC PANEL: CPT

## 2022-08-30 PROCEDURE — 84156 ASSAY OF PROTEIN URINE: CPT

## 2022-08-30 PROCEDURE — 85027 COMPLETE CBC AUTOMATED: CPT

## 2022-08-30 NOTE — PROGRESS NOTES
D/C instructions gone over with pt. Pt verbalized understanding. Pt left unit ambulatory with out any complaints.
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
## 2022-09-07 ENCOUNTER — ROUTINE PRENATAL (OUTPATIENT)
Dept: OBGYN CLINIC | Age: 27
End: 2022-09-07
Payer: COMMERCIAL

## 2022-09-07 VITALS
HEIGHT: 65 IN | WEIGHT: 146 LBS | BODY MASS INDEX: 24.32 KG/M2 | DIASTOLIC BLOOD PRESSURE: 76 MMHG | SYSTOLIC BLOOD PRESSURE: 118 MMHG

## 2022-09-07 DIAGNOSIS — R03.0 ELEVATED BLOOD-PRESSURE READING, WITHOUT DIAGNOSIS OF HYPERTENSION: ICD-10-CM

## 2022-09-07 DIAGNOSIS — O09.893 HISTORY OF PRETERM DELIVERY, CURRENTLY PREGNANT IN THIRD TRIMESTER: ICD-10-CM

## 2022-09-07 DIAGNOSIS — Z34.83 MULTIGRAVIDA IN THIRD TRIMESTER: ICD-10-CM

## 2022-09-07 DIAGNOSIS — O26.843 UTERINE SIZE DATE DISCREPANCY PREGNANCY, THIRD TRIMESTER: Primary | ICD-10-CM

## 2022-09-07 DIAGNOSIS — Z14.8 CARRIER OF SPINAL MUSCULAR ATROPHY: ICD-10-CM

## 2022-09-07 PROCEDURE — 76816 OB US FOLLOW-UP PER FETUS: CPT | Performed by: NURSE PRACTITIONER

## 2022-09-07 PROCEDURE — 99213 OFFICE O/P EST LOW 20 MIN: CPT | Performed by: NURSE PRACTITIONER

## 2022-09-07 ASSESSMENT — PATIENT HEALTH QUESTIONNAIRE - PHQ9
1. LITTLE INTEREST OR PLEASURE IN DOING THINGS: 0
SUM OF ALL RESPONSES TO PHQ QUESTIONS 1-9: 0
SUM OF ALL RESPONSES TO PHQ9 QUESTIONS 1 & 2: 0
SUM OF ALL RESPONSES TO PHQ QUESTIONS 1-9: 0
2. FEELING DOWN, DEPRESSED OR HOPELESS: 0
SUM OF ALL RESPONSES TO PHQ QUESTIONS 1-9: 0
SUM OF ALL RESPONSES TO PHQ QUESTIONS 1-9: 0

## 2022-09-07 NOTE — PATIENT INSTRUCTIONS
Thank you for coming  Return to the office in 1 week  Please call if you have any abdominal pain, & 5 contractions in 1 hour, vaginal bleeding or spotting, or leaking of fluid. You should feel the baby move 10 times in an hour. Monitor this once a day. if you do not feel the baby move this often please call  Please call immediately if you have a headache that won't go away with tylenol, changes to your vision like funny lights or blurriness, nausea or vomiting, upper abdominal pain, or if the baby does not move at least 10 times in 2 hours.

## 2022-09-07 NOTE — PROGRESS NOTES
I have reviewed the patient's visit today including history, exam and assessment by Abner Rubinstein, WHNP-BC. I agree with treatment/plan as above.     James Manzano MD  11:45 AM  09/07/22

## 2022-09-07 NOTE — ASSESSMENT & PLAN NOTE
PTL/labor precautions, [facility], and pregnancy warning signs reviewed. Pt advised to call the office at [phone] or go straight to Labor and Delivery at [facility] with any of the following concerns vaginal bleeding, leaking of fluid, jatinder regularly Q 5-7 minutes for over an hour or not feeling the baby move.    RTO 1 wk

## 2022-09-07 NOTE — PROGRESS NOTES
Patient comes in today for routine prenatal visit. No complaints/concerns today.  White discharge, no odor    Fetal Movement: Yes  Contractions: No  Vaginal Bleeding: No  Leaking Fluid: No  GI/: No    Vitals:    09/07/22 1059   Weight: 146 lb (66.2 kg)   Height: 5' 5\" (1.651 m)

## 2022-09-07 NOTE — PROGRESS NOTES
LAST PAP:  12/2021    LAST MAMMO:  n/a    LMP:  Patient's last menstrual period was 01/03/2022.     BIRTH CONTROL:  none    TOBACCO USE:  No    FAMILY HISTORY OF:   Breast Cancer:  No   Ovarian Cancer:  No   Uterine Cancer:  No   Colon Cancer:  No    Vitals:    09/07/22 1059   Weight: 146 lb (66.2 kg)   Height: 5' 5\" (1.651 m)        iDnh Olsen MA  09/07/22  11:05 AM
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
## 2022-09-14 ENCOUNTER — ROUTINE PRENATAL (OUTPATIENT)
Dept: OBGYN CLINIC | Age: 27
End: 2022-09-14
Payer: COMMERCIAL

## 2022-09-14 VITALS
DIASTOLIC BLOOD PRESSURE: 74 MMHG | HEART RATE: 84 BPM | HEIGHT: 65 IN | WEIGHT: 150 LBS | RESPIRATION RATE: 16 BRPM | SYSTOLIC BLOOD PRESSURE: 132 MMHG | BODY MASS INDEX: 24.99 KG/M2

## 2022-09-14 DIAGNOSIS — Z14.8 CARRIER OF SPINAL MUSCULAR ATROPHY: ICD-10-CM

## 2022-09-14 DIAGNOSIS — Z28.39 RUBELLA NON-IMMUNE STATUS, ANTEPARTUM: ICD-10-CM

## 2022-09-14 DIAGNOSIS — R03.0 ELEVATED BLOOD-PRESSURE READING, WITHOUT DIAGNOSIS OF HYPERTENSION: ICD-10-CM

## 2022-09-14 DIAGNOSIS — O09.899 RUBELLA NON-IMMUNE STATUS, ANTEPARTUM: ICD-10-CM

## 2022-09-14 DIAGNOSIS — O09.893 HISTORY OF PRETERM DELIVERY, CURRENTLY PREGNANT IN THIRD TRIMESTER: ICD-10-CM

## 2022-09-14 DIAGNOSIS — Z34.83 MULTIGRAVIDA IN THIRD TRIMESTER: Primary | ICD-10-CM

## 2022-09-14 PROCEDURE — 99213 OFFICE O/P EST LOW 20 MIN: CPT | Performed by: NURSE PRACTITIONER

## 2022-09-14 NOTE — PROGRESS NOTES
Patient comes in today for routine prenatal visit. She is here for GBS. She also had her progesterone injection yesterday 9/13/22. She is reporting intermittent [place] contractions that generally occur towards the end of the day.       Fetal Movement: Yes  Contractions: Yes  Vaginal Bleeding: No  Leaking Fluid: No  GI/: No    Vitals:    09/14/22 0842   BP: 132/74   Site: Left Upper Arm   Position: Sitting   Pulse: 84   Resp: 16   Weight: 150 lb (68 kg)   Height: 5' 5\" (1.651 m)

## 2022-09-14 NOTE — ASSESSMENT & PLAN NOTE
PTL/labor precautions, [facility], and pregnancy warning signs reviewed. Pt advised to call the office at [phone] or go straight to Labor and Delivery at [facility] with any of the following concerns vaginal bleeding, leaking of fluid, jatinder regularly Q 5-7 minutes for over an hour or not feeling the baby move.    RTO 1 wk OBV  GBS
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
## 2022-09-14 NOTE — PROGRESS NOTES
I have reviewed the patient's visit today including history, exam and assessment by CATERINA Michel. I agree with treatment/plan as above.     Kashmir Horton MD  9:20 AM  09/14/22

## 2022-09-18 LAB
BACTERIA SPEC CULT: NORMAL
SERVICE CMNT-IMP: NORMAL

## 2022-09-21 ENCOUNTER — ROUTINE PRENATAL (OUTPATIENT)
Dept: OBGYN CLINIC | Age: 27
End: 2022-09-21
Payer: COMMERCIAL

## 2022-09-21 VITALS
BODY MASS INDEX: 25.43 KG/M2 | SYSTOLIC BLOOD PRESSURE: 132 MMHG | DIASTOLIC BLOOD PRESSURE: 84 MMHG | WEIGHT: 152.6 LBS | HEIGHT: 65 IN

## 2022-09-21 DIAGNOSIS — O09.893 HISTORY OF PRETERM DELIVERY, CURRENTLY PREGNANT IN THIRD TRIMESTER: ICD-10-CM

## 2022-09-21 DIAGNOSIS — Z14.8 CARRIER OF SPINAL MUSCULAR ATROPHY: ICD-10-CM

## 2022-09-21 DIAGNOSIS — Z34.83 MULTIGRAVIDA IN THIRD TRIMESTER: Primary | ICD-10-CM

## 2022-09-21 DIAGNOSIS — Z28.39 RUBELLA NON-IMMUNE STATUS, ANTEPARTUM: ICD-10-CM

## 2022-09-21 DIAGNOSIS — R03.0 ELEVATED BLOOD-PRESSURE READING, WITHOUT DIAGNOSIS OF HYPERTENSION: ICD-10-CM

## 2022-09-21 DIAGNOSIS — O09.899 RUBELLA NON-IMMUNE STATUS, ANTEPARTUM: ICD-10-CM

## 2022-09-21 PROBLEM — O47.03 THREATENED PREMATURE LABOR IN THIRD TRIMESTER: Status: RESOLVED | Noted: 2022-08-02 | Resolved: 2022-09-21

## 2022-09-21 PROCEDURE — 99213 OFFICE O/P EST LOW 20 MIN: CPT | Performed by: NURSE PRACTITIONER

## 2022-09-21 NOTE — PROGRESS NOTES
Patient comes in today for routine prenatal visit. No complaints/concerns today.        Fetal Movement: Yes  Contractions: No  Vaginal Bleeding: No  Leaking Fluid: No  GI/: No    Vitals:    09/21/22 0851   BP: 132/84   Site: Right Upper Arm   Position: Sitting   Weight: 152 lb 9.6 oz (69.2 kg)   Height: 5' 5\" (1.651 m)
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
## 2022-09-21 NOTE — PROGRESS NOTES
I have reviewed the patient's visit today including history, exam and assessment by CATERINA Medellin. I agree with treatment/plan as above.     Eduardo Burrell MD  9:40 AM  09/21/22

## 2022-09-23 ENCOUNTER — HOSPITAL ENCOUNTER (INPATIENT)
Age: 27
LOS: 2 days | Discharge: HOME | End: 2022-09-25
Attending: OBSTETRICS & GYNECOLOGY | Admitting: OBSTETRICS & GYNECOLOGY
Payer: COMMERCIAL

## 2022-09-23 PROBLEM — O09.899 RUBELLA NON-IMMUNE STATUS, ANTEPARTUM: Status: ACTIVE | Noted: 2022-03-29

## 2022-09-23 PROBLEM — Z28.39 RUBELLA NON-IMMUNE STATUS, ANTEPARTUM: Status: ACTIVE | Noted: 2022-03-29

## 2022-09-23 LAB
ABO + RH BLD: NORMAL
BLOOD GROUP ANTIBODIES SERPL: NORMAL
ERYTHROCYTE [DISTWIDTH] IN BLOOD BY AUTOMATED COUNT: 11.9 % (ref 11.9–14.6)
HCT VFR BLD AUTO: 38.4 % (ref 35.8–46.3)
HGB BLD-MCNC: 13 G/DL (ref 11.7–15.4)
MCH RBC QN AUTO: 29.1 PG (ref 26.1–32.9)
MCHC RBC AUTO-ENTMCNC: 33.9 G/DL (ref 31.4–35)
MCV RBC AUTO: 85.9 FL (ref 79.6–97.8)
NRBC # BLD: 0 K/UL (ref 0–0.2)
PLATELET # BLD AUTO: 350 K/UL (ref 150–450)
PMV BLD AUTO: 9.8 FL (ref 9.4–12.3)
RBC # BLD AUTO: 4.47 M/UL (ref 4.05–5.2)
SPECIMEN EXP DATE BLD: NORMAL
WBC # BLD AUTO: 14.5 K/UL (ref 4.3–11.1)

## 2022-09-23 PROCEDURE — 6370000000 HC RX 637 (ALT 250 FOR IP): Performed by: OBSTETRICS & GYNECOLOGY

## 2022-09-23 PROCEDURE — 36415 COLL VENOUS BLD VENIPUNCTURE: CPT

## 2022-09-23 PROCEDURE — 99284 EMERGENCY DEPT VISIT MOD MDM: CPT

## 2022-09-23 PROCEDURE — 7220000101 HC DELIVERY VAGINAL/SINGLE

## 2022-09-23 PROCEDURE — 85027 COMPLETE CBC AUTOMATED: CPT

## 2022-09-23 PROCEDURE — 7210000100 HC LABOR FEE PER 1 HR

## 2022-09-23 PROCEDURE — 86901 BLOOD TYPING SEROLOGIC RH(D): CPT

## 2022-09-23 PROCEDURE — 59025 FETAL NON-STRESS TEST: CPT

## 2022-09-23 PROCEDURE — 7100000010 HC PHASE II RECOVERY - FIRST 15 MIN

## 2022-09-23 PROCEDURE — 59409 OBSTETRICAL CARE: CPT | Performed by: OBSTETRICS & GYNECOLOGY

## 2022-09-23 PROCEDURE — 1100000000 HC RM PRIVATE

## 2022-09-23 PROCEDURE — 7100000011 HC PHASE II RECOVERY - ADDTL 15 MIN

## 2022-09-23 RX ORDER — POLYETHYLENE GLYCOL 3350 17 G/17G
17 POWDER, FOR SOLUTION ORAL DAILY
Status: DISCONTINUED | OUTPATIENT
Start: 2022-09-23 | End: 2022-09-25 | Stop reason: HOSPADM

## 2022-09-23 RX ORDER — SODIUM CHLORIDE 0.9 % (FLUSH) 0.9 %
5-40 SYRINGE (ML) INJECTION PRN
Status: DISCONTINUED | OUTPATIENT
Start: 2022-09-23 | End: 2022-09-23 | Stop reason: HOSPADM

## 2022-09-23 RX ORDER — ONDANSETRON 8 MG/1
8 TABLET, ORALLY DISINTEGRATING ORAL EVERY 8 HOURS PRN
Status: DISCONTINUED | OUTPATIENT
Start: 2022-09-23 | End: 2022-09-25 | Stop reason: HOSPADM

## 2022-09-23 RX ORDER — ACETAMINOPHEN 325 MG/1
650 TABLET ORAL EVERY 4 HOURS PRN
Status: DISCONTINUED | OUTPATIENT
Start: 2022-09-23 | End: 2022-09-23 | Stop reason: HOSPADM

## 2022-09-23 RX ORDER — SODIUM CHLORIDE, SODIUM LACTATE, POTASSIUM CHLORIDE, CALCIUM CHLORIDE 600; 310; 30; 20 MG/100ML; MG/100ML; MG/100ML; MG/100ML
INJECTION, SOLUTION INTRAVENOUS CONTINUOUS
Status: DISCONTINUED | OUTPATIENT
Start: 2022-09-23 | End: 2022-09-25 | Stop reason: HOSPADM

## 2022-09-23 RX ORDER — SODIUM CHLORIDE 0.9 % (FLUSH) 0.9 %
5-40 SYRINGE (ML) INJECTION EVERY 12 HOURS SCHEDULED
Status: DISCONTINUED | OUTPATIENT
Start: 2022-09-23 | End: 2022-09-23 | Stop reason: HOSPADM

## 2022-09-23 RX ORDER — SODIUM CHLORIDE, SODIUM LACTATE, POTASSIUM CHLORIDE, AND CALCIUM CHLORIDE .6; .31; .03; .02 G/100ML; G/100ML; G/100ML; G/100ML
500 INJECTION, SOLUTION INTRAVENOUS PRN
Status: DISCONTINUED | OUTPATIENT
Start: 2022-09-23 | End: 2022-09-23 | Stop reason: HOSPADM

## 2022-09-23 RX ORDER — TRANEXAMIC ACID 10 MG/ML
1000 INJECTION, SOLUTION INTRAVENOUS
Status: DISCONTINUED | OUTPATIENT
Start: 2022-09-23 | End: 2022-09-23 | Stop reason: HOSPADM

## 2022-09-23 RX ORDER — ACETAMINOPHEN 500 MG
1000 TABLET ORAL EVERY 8 HOURS PRN
Status: DISCONTINUED | OUTPATIENT
Start: 2022-09-23 | End: 2022-09-25 | Stop reason: HOSPADM

## 2022-09-23 RX ORDER — IBUPROFEN 800 MG/1
800 TABLET ORAL EVERY 6 HOURS
Status: DISCONTINUED | OUTPATIENT
Start: 2022-09-23 | End: 2022-09-25 | Stop reason: HOSPADM

## 2022-09-23 RX ORDER — SODIUM CHLORIDE, SODIUM LACTATE, POTASSIUM CHLORIDE, CALCIUM CHLORIDE 600; 310; 30; 20 MG/100ML; MG/100ML; MG/100ML; MG/100ML
INJECTION, SOLUTION INTRAVENOUS CONTINUOUS
Status: DISCONTINUED | OUTPATIENT
Start: 2022-09-23 | End: 2022-09-23 | Stop reason: HOSPADM

## 2022-09-23 RX ORDER — SODIUM CHLORIDE 0.9 % (FLUSH) 0.9 %
5-40 SYRINGE (ML) INJECTION PRN
Status: DISCONTINUED | OUTPATIENT
Start: 2022-09-23 | End: 2022-09-25 | Stop reason: HOSPADM

## 2022-09-23 RX ORDER — SODIUM CHLORIDE 9 MG/ML
INJECTION, SOLUTION INTRAVENOUS PRN
Status: DISCONTINUED | OUTPATIENT
Start: 2022-09-23 | End: 2022-09-25 | Stop reason: HOSPADM

## 2022-09-23 RX ORDER — LIDOCAINE HYDROCHLORIDE 10 MG/ML
5 INJECTION, SOLUTION INFILTRATION; PERINEURAL ONCE
Status: DISCONTINUED | OUTPATIENT
Start: 2022-09-23 | End: 2022-09-23

## 2022-09-23 RX ORDER — SODIUM CHLORIDE 9 MG/ML
25 INJECTION, SOLUTION INTRAVENOUS PRN
Status: DISCONTINUED | OUTPATIENT
Start: 2022-09-23 | End: 2022-09-23 | Stop reason: HOSPADM

## 2022-09-23 RX ORDER — LIDOCAINE HYDROCHLORIDE 20 MG/ML
JELLY TOPICAL PRN
Status: DISCONTINUED | OUTPATIENT
Start: 2022-09-23 | End: 2022-09-23

## 2022-09-23 RX ORDER — ONDANSETRON 2 MG/ML
4 INJECTION INTRAMUSCULAR; INTRAVENOUS EVERY 6 HOURS PRN
Status: DISCONTINUED | OUTPATIENT
Start: 2022-09-23 | End: 2022-09-23 | Stop reason: HOSPADM

## 2022-09-23 RX ORDER — SODIUM CHLORIDE 0.9 % (FLUSH) 0.9 %
5-40 SYRINGE (ML) INJECTION EVERY 12 HOURS SCHEDULED
Status: DISCONTINUED | OUTPATIENT
Start: 2022-09-23 | End: 2022-09-25 | Stop reason: ALTCHOICE

## 2022-09-23 RX ORDER — SODIUM CHLORIDE, SODIUM LACTATE, POTASSIUM CHLORIDE, AND CALCIUM CHLORIDE .6; .31; .03; .02 G/100ML; G/100ML; G/100ML; G/100ML
1000 INJECTION, SOLUTION INTRAVENOUS PRN
Status: DISCONTINUED | OUTPATIENT
Start: 2022-09-23 | End: 2022-09-23 | Stop reason: HOSPADM

## 2022-09-23 RX ORDER — LANOLIN
CREAM (ML) TOPICAL PRN
Status: DISCONTINUED | OUTPATIENT
Start: 2022-09-23 | End: 2022-09-25 | Stop reason: HOSPADM

## 2022-09-23 RX ORDER — OXYCODONE HYDROCHLORIDE 5 MG/1
5 TABLET ORAL EVERY 4 HOURS PRN
Status: DISCONTINUED | OUTPATIENT
Start: 2022-09-23 | End: 2022-09-25 | Stop reason: HOSPADM

## 2022-09-23 RX ADMIN — ACETAMINOPHEN 1000 MG: 500 TABLET, FILM COATED ORAL at 17:22

## 2022-09-23 RX ADMIN — LIDOCAINE HYDROCHLORIDE: 20 JELLY TOPICAL at 12:55

## 2022-09-23 RX ADMIN — IBUPROFEN 800 MG: 800 TABLET ORAL at 20:25

## 2022-09-23 RX ADMIN — IBUPROFEN 800 MG: 800 TABLET ORAL at 14:14

## 2022-09-23 RX ADMIN — POLYETHYLENE GLYCOL 3350 17 G: 17 POWDER, FOR SOLUTION ORAL at 17:36

## 2022-09-23 ASSESSMENT — PAIN DESCRIPTION - LOCATION: LOCATION: ABDOMEN

## 2022-09-23 ASSESSMENT — PAIN DESCRIPTION - DESCRIPTORS: DESCRIPTORS: CRAMPING

## 2022-09-23 ASSESSMENT — PAIN DESCRIPTION - ORIENTATION: ORIENTATION: ANTERIOR;LOWER

## 2022-09-23 NOTE — L&D DELIVERY NOTE
Mother's Information      Labor Events     Labor?: No  Cervical Ripening:   Now               Hannah Nelson [[id number]]      Labor Events     Labor?: No   Steroids?: None  Cervical Ripening Date/Time:     Antibiotics Received during Labor?: No  Rupture Date/Time:     Rupture Type: Intact  Fluid Color: Clear  Meconium Consistency: Moderate  Fluid Odor: None  Fluid Volume:  Moderate  Augmentation: None  Labor Complications: None       Anesthesia    Method: None       Start Pushing      Labor onset date/time:   Now     Dilation complete date/time: 22 12:30:00 Now     Start pushing date/time: 2022 12:38:00   Decision date/time (emergent ):           Labor Length    2nd stage: 0h 27m  3rd stage: 0h 06m       Delivery ()      Delivery Date/Time:  22 12:57:00   Delivery Type: Vaginal, Spontaneous    Details:             Presentation    Presentation: Vertex  Position: Left  _: Occiput  _: Anterior       Shoulder Dystocia    Shoulder Dystocia Present?: No  Add Second Maneuver  Add Third Maneuver  Add Fourth Maneuver  Add Fifth Maneuver  Add Sixth Maneuver  Add Seventh Maneuver  Add Eighth Maneuver  Add Ninth Maneuver       Assisted Delivery Details    Forceps Attempted?: No  Vacuum Extractor Attempted?: No       Document Additional Attempt         Document Additional Attempt                 Cord    Vessels: 3 Vessels  Complications: Nuchal Loose  Cord Around: Head  Delayed Cord Clamping?: Yes  Cord Clamped Date/Time: 2022 12:58:00  Cord Blood Disposition: Lab  Gases Sent?: No       Placenta    Date/Time: 2022 13:03:00  Removal: Spontaneous  Appearance: Intact  Disposition: Family       Lacerations    Episiotomy: None  Perineal Lacerations: None  Other Lacerations: no non-perineal laceration       Vaginal Counts    Initial Count Personnel: JAGDISH PÉREZ  Initial Count Verified By: Cornel Stafford RN    Sponges [place] Instruments Initial Counts Correct Correct Correct   Final Counts      Final Count Personnel: Omar July  Final Count Verified By: Tracy Caicedo RN  Accurate Final Count?: Yes  If the count is incorrect due to Intentionally Retained Foreign Object (IRFO) add the IRFO LDA in Lines/Drains. Add LDA: Link to 2 [facility]       Blood Loss  Mother: Ema Carreon #[id number]     Start of Mother's Information      Delivery Blood Loss  22 0057 - 22 1805      Quantitative Blood Loss (mL) Hospital Encounter 110 grams    Total  110 mL               End of Mother's Information  Mother: Ema Carreon #[id number]                Delivery Providers    Delivering clinician: Ronak Crawford MD     Provider Role    Ronak Crawford MD Obstetrician    Jairo Ortega, RN Primary Nurse    Faizan Patel, RN Primary  Nurse    Joyce Ramos [facility]              [place] Assessment    Living Status: Living  Delivery Location Comment: 428     Apgar Scoring Key:    0 1 2    Skin Color: Blue or pale Acrocyanotic Completely pink    Heart Rate: Absent <100 bpm >100 bpm    Reflex Irritability: No response Grimace Cry or active withdrawal    Muscle Tone: Limp Some flexion Active motion    Respiratory Effort: Absent Weak cry; hypoventilation Good, crying                      Skin Color:   Heart Rate:   Reflex Irritability:   Muscle Tone:   Respiratory Effort:    Total:            1 Minute:    0    2    2    2    2    8        Apgar 1 total from OB History    5 Minute:    1    2    2    2    2    9        Apgar 5 total from OB History    10 Minute:              15 Minute:              20 Minute:                        Apgars Assigned By: Lalito GONZALES              Resuscitation    Method: Bulb Suction, Stimulation              Measurements      Birth Weight: 2760 g Birth Length: 0.5 m     Head Circumference: 0.33 m Chest Circumference: 0.33 m              Title      Skin to Skin Initiation Date/Time: 9/23/22 12:57:00 EDT     Skin to Skin With: Mother     Skin to Skin End Date/Time: 9/23/22 14:30:00

## 2022-09-23 NOTE — PLAN OF CARE
Problem: Pain  Goal: Verbalizes/displays adequate comfort level or baseline comfort level  Outcome: Progressing     Problem: Safety - Adult  Goal: Free from fall injury  Outcome: Progressing     Problem: Vaginal Birth or  Section  Goal: Fetal and maternal status remain reassuring during the birth process  Description:  Birth OB-Pregnancy care plan goal which identifies if the fetal and maternal status remain reassuring during the birth process  Outcome: Progressing     Problem: Postpartum  Goal: Experiences normal postpartum course  Description:  Postpartum OB-Pregnancy care plan goal which identifies if the mother is experiencing a normal postpartum course  Outcome: Progressing  Goal: Appropriate maternal -  bonding  Description:  Postpartum OB-Pregnancy care plan goal which identifies if the mother and  are bonding appropriately  Outcome: Progressing  Goal: Establishment of infant feeding pattern  Description:  Postpartum OB-Pregnancy care plan goal which identifies if the mother is establishing a feeding pattern with their   Outcome: Progressing     Problem: Infection - Adult  Goal: Absence of infection at discharge  Outcome: Progressing  Goal: Absence of infection during hospitalization  Outcome: Progressing  Goal: Absence of fever/infection during anticipated neutropenic period  Outcome: Progressing     Problem: Discharge Planning  Goal: Discharge to home or other facility with appropriate resources  Outcome: Progressing

## 2022-09-23 NOTE — PROGRESS NOTES
Dr. Arnold Comes called. New orders received for every 4 hrs vital signs through the night and call for blood pressures >160/100. Telephone with read back.
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
## 2022-09-24 PROCEDURE — 1100000000 HC RM PRIVATE

## 2022-09-24 PROCEDURE — 6370000000 HC RX 637 (ALT 250 FOR IP): Performed by: OBSTETRICS & GYNECOLOGY

## 2022-09-24 RX ADMIN — ACETAMINOPHEN 1000 MG: 500 TABLET, FILM COATED ORAL at 08:44

## 2022-09-24 RX ADMIN — IBUPROFEN 800 MG: 800 TABLET ORAL at 15:18

## 2022-09-24 RX ADMIN — ACETAMINOPHEN 1000 MG: 500 TABLET, FILM COATED ORAL at 01:43

## 2022-09-24 RX ADMIN — POLYETHYLENE GLYCOL 3350 17 G: 17 POWDER, FOR SOLUTION ORAL at 08:45

## 2022-09-24 RX ADMIN — IBUPROFEN 800 MG: 800 TABLET ORAL at 04:16

## 2022-09-24 RX ADMIN — IBUPROFEN 800 MG: 800 TABLET ORAL at 22:22

## 2022-09-24 ASSESSMENT — PAIN DESCRIPTION - LOCATION: LOCATION: ABDOMEN

## 2022-09-24 ASSESSMENT — PAIN DESCRIPTION - DESCRIPTORS: DESCRIPTORS: CRAMPING

## 2022-09-24 ASSESSMENT — PAIN DESCRIPTION - ORIENTATION: ORIENTATION: LOWER

## 2022-09-24 ASSESSMENT — PAIN SCALES - GENERAL: PAINLEVEL_OUTOF10: 3

## 2022-09-24 NOTE — LACTATION NOTE

## 2022-09-24 NOTE — PROGRESS NOTES
Progress Note                               Patient: Rhianna Fitzpatrick MRN: [id number]  SSN: [id number]    YOB: 1995  Age: 24 y.o. Sex: female      Postpartum Day Number 1    Subjective:     Patient doing well without significant complaints. Ambulating, voiding without difficulty and Patient desires early discharge today. Patient reports normal lochia. . Infant: Breastfeeding and/or pumping    Objective:     Patient Vitals for the past 18 hrs:   Temp Pulse Resp BP SpO2   22 0745 98.1 °F (36.7 °C) 66 16 (!) 140/94 98 %   22 0324 98.6 °F (37 °C) 67 15 (!) 125/59 97 %   22 2255 98.2 °F (36.8 °C) 72 18 126/75 97 %   22 1935 -- 81 17 125/68 96 %        Temp (24hrs), Av.4 °F (36.9 °C), Min:98.1 °F (36.7 °C), Max:99 °F (37.2 °C)      Physical Exam:    Patient without distress. Neuro / Psych grossly WNL, A&O X 3    Lab/Data Review: All lab results for the last 24 hours reviewed. GBS: No results found for: GRBSEXT  Blood Type:   Lab Results   Component Value Date/Time    ABORH B POSITIVE 2022 10:03 AM        Assessment and Plan:     Patient appears to be having an uncomplicated postpartum course. Continue routine perineal care and maternal education. , advised against discharge given the elevated pressures which occurred again this AM.  Risks of PP PreE discussed, voices understanding.     Janett Muhammad MD

## 2022-09-24 NOTE — LACTATION NOTE
This note was copied from a baby's chart. In to see mom and infant for first time. Possible early discharge today. 2nd baby. First baby in SCN and was not able to get baby to latch. Mom pump and bottle fed baby for 1 yr. This  has been latching and feeding for some short feeds. Mom has also wanted to pump after some feeds to help bring her milk in quicker. She also has some of her sister-in-laws breast milk at bedside she has been giving back to baby after feeds. Reviewed breast milk storage rules w/ mom. Discussed 1st and 2nd 24 hr feeding/output expectations. Also discussed normacly of periods of cluster feeding. Will meet mom back for next feeding session as this is her first baby to direct breast feed.

## 2022-09-24 NOTE — CARE COORDINATION
Discussed with pt about support at home, denies any needs for self or baby. A postpartum support packet was given, opportunities for questions/concerns given. Pt to d/c home with medically cleared.

## 2022-09-24 NOTE — PLAN OF CARE
Problem: Pain  Goal: Verbalizes/displays adequate comfort level or baseline comfort level  2022 by Earl Park RN  Outcome: Progressing  2022 by Manas Wiley RN  Outcome: Progressing     Problem: Safety - Adult  Goal: Free from fall injury  2022 by Earl Park RN  Outcome: Progressing  2022 by Manas Wiley RN  Outcome: Progressing     Problem: Vaginal Birth or  Section  Goal: Fetal and maternal status remain reassuring during the birth process  Description:  Birth OB-Pregnancy care plan goal which identifies if the fetal and maternal status remain reassuring during the birth process  2022 by Earl Park RN  Outcome: Progressing  2022 by Manas Wiley RN  Outcome: Progressing     Problem: Postpartum  Goal: Experiences normal postpartum course  Description:  Postpartum OB-Pregnancy care plan goal which identifies if the mother is experiencing a normal postpartum course  2022 by Earl Park RN  Outcome: Progressing  2022 by Manas Wiley RN  Outcome: Progressing  Goal: Appropriate maternal -  bonding  Description:  Postpartum OB-Pregnancy care plan goal which identifies if the mother and  are bonding appropriately  2022 by Earl Park RN  Outcome: Progressing  2022 by Manas Wiley RN  Outcome: Progressing  Goal: Establishment of infant feeding pattern  Description:  Postpartum OB-Pregnancy care plan goal which identifies if the mother is establishing a feeding pattern with their   2022 by Earl Park RN  Outcome: Progressing  2022 by Manas Wiley RN  Outcome: Progressing     Problem: Infection - Adult  Goal: Absence of infection at discharge  2022 by Earl Park RN  Outcome: Progressing  2022 09 by Manas Wiley RN  Outcome: Progressing  Goal: Absence of infection during hospitalization  2022 by Maryana Rod JIMBO Amaral  Outcome: Progressing  9/23/2022 0959 by Maria Luisa Martinez RN  Outcome: Progressing  Goal: Absence of fever/infection during anticipated neutropenic period  9/23/2022 2121 by Joya Gaviria RN  Outcome: Progressing  9/23/2022 0959 by Maria Luisa Martinez RN  Outcome: Progressing     Problem: Discharge Planning  Goal: Discharge to home or other facility with appropriate resources  9/23/2022 2121 by Joya Gaviria RN  Outcome: Progressing  9/23/2022 0959 by Maria Luisa Martinez RN  Outcome: Progressing

## 2022-09-25 VITALS
HEIGHT: 65 IN | SYSTOLIC BLOOD PRESSURE: 126 MMHG | OXYGEN SATURATION: 99 % | HEART RATE: 70 BPM | BODY MASS INDEX: 25.33 KG/M2 | RESPIRATION RATE: 16 BRPM | DIASTOLIC BLOOD PRESSURE: 78 MMHG | TEMPERATURE: 97.9 F | WEIGHT: 152 LBS

## 2022-09-25 PROCEDURE — 6370000000 HC RX 637 (ALT 250 FOR IP): Performed by: OBSTETRICS & GYNECOLOGY

## 2022-09-25 RX ORDER — IBUPROFEN 800 MG/1
800 TABLET ORAL EVERY 8 HOURS PRN
Qty: 90 TABLET | Refills: 0 | Status: SHIPPED | OUTPATIENT
Start: 2022-09-25

## 2022-09-25 RX ADMIN — POLYETHYLENE GLYCOL 3350 17 G: 17 POWDER, FOR SOLUTION ORAL at 09:17

## 2022-09-25 RX ADMIN — IBUPROFEN 800 MG: 800 TABLET ORAL at 04:25

## 2022-09-25 RX ADMIN — IBUPROFEN 800 MG: 800 TABLET ORAL at 09:53

## 2022-09-25 NOTE — PROGRESS NOTES
Shift assessment complete see flowsheet. Discussed today plan of care with pt. Bleeding precautions given. No s/s of distress noted. Pt to call with needs/concerns. Pt in bed with call light in reach.

## 2022-09-25 NOTE — LACTATION NOTE
This note was copied from a baby's chart. In to see mom and infant for discharge. Mom recently fed baby and is now pumping after per mom's choice. Her milk is starting to come in. She feels baby is overall breast feeding better now, but at times feeding is fair so will continue to pump after those feedings and give back any expressed colostrum. Gave mom feeding plan for guidance at home and she verbalized understanding how to use. Monitor output closely. Reviewed how to manage period of engorgement and discharge instructions. No further needs at this time.

## 2022-09-25 NOTE — DISCHARGE INSTRUCTIONS
After Your Delivery (the Postpartum Period): Care Instructions  Overview     Congratulations on the birth of your baby. Like pregnancy, the  period can be a time of excitement, vaishali, and exhaustion. You may look at your wondrous little baby and feel happy. You may also be overwhelmed by your new sleep hours and new responsibilities. At first, babies often sleep during the days and are awake at night. They do not have a pattern or routine. They may make sudden gasps, jerk themselves awake, or look like they have crossed eyes. These are all normal, and they may even make you smile. In these first weeks after delivery, try to take good care of yourself. It may take 4 to 6 weeks to feel like yourself again, and possibly longer if you had a  birth. You will likely feel very tired for several weeks. Your days will be full of ups and downs, but lots of vaishali as well. Follow-up care is a key part of your treatment and safety. Be sure to make and go to all appointments, and call your doctor if you are having problems. It's also a good idea to know your test results and keep a list of the medicines you take. How can you care for yourself at home? Take care of your body after delivery  Use pads instead of tampons for the bloody flow that may last as long as 2 weeks. Ease cramps with ibuprofen (Advil, Motrin). Ease soreness of hemorrhoids and the area between your vagina and rectum with ice compresses or witch hazel pads. Ease constipation by drinking lots of fluid and eating high-fiber foods. Ask your doctor about over-the-counter stool softeners. Cleanse yourself with a gentle squeeze of warm water from a bottle instead of wiping with toilet paper. Take a sitz bath in warm water several times a day. Wear a good nursing bra. Ease sore and swollen breasts with warm, wet washcloths. If you aren't breastfeeding, use ice rather than heat for breast soreness.   Your period may not start for several to 6 weeks, but rest when you feel tired. Learn exercises to tone your belly. Try Kegel exercises to regain strength in your pelvic muscles. You can do these exercises while you stand or sit. (If doing these exercises causes pain, stop doing them and talk with your doctor.)  Squeeze your muscles as if you were trying not to pass gas. Or squeeze your muscles as if you were stopping the flow of urine. Your belly, legs, and buttocks shouldn't move. Hold the squeeze for 3 seconds, then relax for 5 to 10 seconds. Start with 3 seconds, then add 1 second each week until you are able to squeeze for 10 seconds. Repeat the exercise 10 times a session. Do 3 to 8 sessions a day. Find a class for you and your baby that has an exercise time. If you had a  birth, give yourself a bit more time before you exercise, and be careful. When should you call for help? Share this information with your partner, family, or a friend. They can help you watch for warning signs. Call 911  anytime you think you may need emergency care. For example, call if:    You have thoughts of harming yourself, your baby, or another person. You passed out (lost consciousness). You have chest pain, are short of breath, or cough up blood. You have a seizure. Call your doctor now or seek immediate medical care if:    You have signs of hemorrhage (too much bleeding), such as:  Heavy vaginal bleeding. This means that you are soaking through one or more pads in an hour. Or you pass blood clots bigger than an egg. Feeling dizzy or lightheaded, or you feel like you may faint. Feeling so tired or weak that you cannot do your usual activities. A fast or irregular heartbeat. New or worse belly pain. You have signs of infection, such as:  A fever. Vaginal discharge that smells bad. New or worse belly pain.      You have symptoms of a blood clot in your leg (called a deep vein thrombosis), such as:  Pain in the calf, back of the knee, thigh, or groin. Redness and swelling in your leg or groin. You have signs of preeclampsia, such as:  Sudden swelling of your face, hands, or feet. New vision problems (such as dimness, blurring, or seeing spots). A severe headache. Watch closely for changes in your health, and be sure to contact your doctor if:    Your vaginal bleeding isn't decreasing. You feel sad, anxious, or hopeless for more than a few days. You are having problems with your breasts or breastfeeding. Where can you learn more? Go to https://CitalDocpepiceweb.Njuice. org and sign in to your [employer] account. Enter Q062 in the [employer] box to learn more about \"After Your Delivery (the Postpartum Period): Care Instructions. \"     If you do not have an account, please click on the \"Sign Up Now\" link. Current as of: February 23, 2022               Content Version: 13.4  © 2006-2022 [employer]. Care instructions adapted under license by [facility] ([facility]). If you have questions about a medical condition or this instruction, always ask your healthcare professional. [place] [address] any warranty or liability for your use of this information.

## 2022-09-25 NOTE — DISCHARGE SUMMARY
Discharge Summary     Date of Admission:  2022  8:55 AM  Date of Discharge:  2022       Piero Bansal 24 y.o. [id number] presented at 37w4d in active labor. Pt had  without incident. See delivery note for all delivery details. Pt's PP course was uneventful. On day of D/C, she was ambulating well, afebrile, with lochia < menses. She was discharged home with medications as below. Pt was breast feeding on discharge. She plans on unsure for birth control. HTN diagnosis: no but had occ ~140/90 PP. Most bp's WNL. Routine PP instructions given to patient. She is to follow up with [facility] in 1-2 weeks for bp ck. No problem-specific Assessment & Plan notes found for this encounter.           Medication List        START taking these medications      ibuprofen 800 MG tablet  Commonly known as: ADVIL;MOTRIN  Take 1 tablet by mouth every 8 hours as needed for Pain            CONTINUE taking these medications      PRENATAL PO               Where to Get Your Medications        These medications were sent to [facility] [address] [place] [address] [address] [place] [address]      Phone: [phone]   ibuprofen 800 MG tablet         Julia Butcher MD, MD  10:19 AM  22

## 2022-09-25 NOTE — LACTATION NOTE
This note was copied from a baby's chart. Individualized Feeding Plan for Breastfeeding   Lactation Services [phone]    As much as possible, hold your baby on your chest so babys bare skin is against your bare skin with a blanket covering babys back, especially 30 minutes before it is time for baby to eat. Watch for early feeding cues such as, licking lips, sucking motions, rooting, hands to mouth. Crying is a late feeding cue. Feed your baby at least 8 times in 24 hours, or more if your baby is showing feeding cues. If baby is sleepy put baby skin to skin and watch for hunger cues. To rouse baby: unwrap, undress, massage hands, feet, & back, change diaper, gently change babys position from lying to sitting. 15-20 minutes on the first breast of active breastfeeding is considered a good feeding. Good, active breastfeeding is when baby is alert, tugging the nipple, their ear may move, and you can hear swallows. Allow baby to finish the first side before changing sides. Sleeping at the breast or only brief, light sucks should not be considered a good, full breastfeed. At each feeding:  __x__1. Do Suck Practice on finger before each feeding until sucking pattern is smooth. Try using index finger. Nail down towards tongue. __x__2. Hand Express for a few minutes prior to latching to help start milk flow. __x__3. Baby needs to NURSE WELL x 15-20 minutes on at least first breast, burp and offer 2nd breast at every feeding. If no sustained latch only attempt at breast for 10 minutes. If baby does not latch on and feed well on at least one side, you should:   __x__4. Double pump for 15 minutes with breast massage and compression. Hand express for an additional 2-3 minutes per side. Pump after each feeding attempt or poor feeding, up to 8 times per day. If you are not putting baby to the breast you need to pump 8 times a day. Pump every 3 hours. __x__5.  Give baby all of the breast milk you obtain using a straight syringe or  curved syringe. If baby does NOT have enough wet and dirty diapers per day, is jaundiced/lethargic, or has significant weight loss AND you do NOT pump enough milk for each feeding (per volume listed below), formula supplementation may need to be used. Call lactation department /pediatrician if you have concerns. AVERAGE INTAKES OF COLOSTRUM BY HEALTHY  INFANTS:  Time  Day Intake (ml per feeding)  Based on 8 feedings per day. 1st 24 hrs  1 2-10 ml  24-48 hrs  2 5-15 ml  48-72 hrs  3 15-30 ml (0.5-1 oz)  72-96 hrs  4 30-45 ml (1-1.5oz)                          5-6      45-60 ml (1.5-2oz)                           7          60-75 ml (2-2.5oz)      By day 7, baby will need 60 ml or 2 oz at each feeding based on 8 feedings per day & babys weight. (1oz = 30ml). Total milk volume needed in 24 hours by Day 7 is 16-17 oz per day based on baby's birthweight of 6lb 1oz. The more often baby eats, the less volume they need per feeding. If baby is eating more often than the minimum of 8 times per day, they may take less per feeding. Comments: Use plan as needed if not latching well. If giving formula after a , will also need to pump for 10 minutes. If baby does not latch and you just bottle feed, pump for 15 minutes. If pumping, suggest using olive oil or coconut oil on your nipples before pumping to help reduce the friction. Use feeding plan until follow up with pediatrician. Continue to attempt at the breast for most feeds. Pump every 3 hours if no latch. Give all pumped colostrum/breastmilk at each feeding. OUTPATIENT APPOINTMENT Suggested. Outpatient services are located on the 4th floor at [facility]. Check in at the 4th floor registration desk (the same one you used when you came to have your baby).   Call for questions [phone]

## 2022-09-25 NOTE — PROGRESS NOTES
Post-Partum Day Number 2 Progress/Discharge Note  Keira Vigil  [id number]    Patient doing well post-partum without significant complaint. Voiding without difficulty, normal lochia, positive flatus. Vitals:  Patient Vitals for the past 24 hrs:   BP Temp Temp src Pulse Resp SpO2   22 0726 126/78 97.9 °F (36.6 °C) Oral 70 16 99 %   22 0306 124/83 98.4 °F (36.9 °C) -- 69 15 98 %   22 2329 (!) 127/59 98.2 °F (36.8 °C) Oral 72 16 98 %   22 1914 130/84 97.9 °F (36.6 °C) Oral 66 16 99 %   22 1726 (!) 142/92 97.8 °F (36.6 °C) Oral 86 16 98 %   22 1452 138/75 -- Oral 74 16 98 %   22 1043 137/78 -- -- 77 -- --     Temp (24hrs), Av °F (36.7 °C), Min:97.8 °F (36.6 °C), Max:98.4 °F (36.9 °C)      Vital signs stable, afebrile. Pt has had bp ~140/90 x2 in the past 48hrs. All other bp's wnl. Exam:  Patient without distress. Abdomen soft, fundus firm at level of umbilicus, nontender               LEs - no edema              Labs: No results found for this or any previous visit (from the past 24 hour(s)). Assessment and Plan:  Patient appears to be having uncomplicated post-partum course. Continue routine perineal care and maternal education. Plan discharge for today with follow up in office in 1-2 weeks for bp ck.

## 2022-09-30 ENCOUNTER — NURSE ONLY (OUTPATIENT)
Dept: OBGYN CLINIC | Age: 27
End: 2022-09-30
Payer: COMMERCIAL

## 2022-09-30 VITALS
HEIGHT: 65 IN | DIASTOLIC BLOOD PRESSURE: 90 MMHG | SYSTOLIC BLOOD PRESSURE: 148 MMHG | BODY MASS INDEX: 23.32 KG/M2 | WEIGHT: 140 LBS

## 2022-09-30 PROCEDURE — 99211 OFF/OP EST MAY X REQ PHY/QHP: CPT | Performed by: OBSTETRICS & GYNECOLOGY

## 2022-10-06 ENCOUNTER — NURSE ONLY (OUTPATIENT)
Dept: OBGYN CLINIC | Age: 27
End: 2022-10-06
Payer: COMMERCIAL

## 2022-10-06 VITALS
HEIGHT: 65 IN | BODY MASS INDEX: 23.19 KG/M2 | DIASTOLIC BLOOD PRESSURE: 92 MMHG | SYSTOLIC BLOOD PRESSURE: 142 MMHG | WEIGHT: 139.2 LBS

## 2022-10-06 DIAGNOSIS — R03.0 ELEVATED BLOOD-PRESSURE READING, WITHOUT DIAGNOSIS OF HYPERTENSION: Primary | ICD-10-CM

## 2022-10-06 PROCEDURE — 99211 OFF/OP EST MAY X REQ PHY/QHP: CPT | Performed by: OBSTETRICS & GYNECOLOGY

## 2022-10-06 NOTE — PROGRESS NOTES
Pt comes in today for BP check.        Vitals:    10/06/22 0822   BP: (!) 142/92   Site: Left Upper Arm   Position: Sitting   Weight: 139 lb 3.2 oz (63.1 kg)   Height: 5' 5\" (1.651 m)        Willy Spangler  10/06/22  8:29 AM

## 2022-11-04 ENCOUNTER — POSTPARTUM VISIT (OUTPATIENT)
Dept: OBGYN CLINIC | Age: 27
End: 2022-11-04
Payer: MEDICAID

## 2022-11-04 VITALS
DIASTOLIC BLOOD PRESSURE: 70 MMHG | HEIGHT: 65 IN | BODY MASS INDEX: 21.99 KG/M2 | SYSTOLIC BLOOD PRESSURE: 110 MMHG | WEIGHT: 132 LBS

## 2022-11-04 ASSESSMENT — PATIENT HEALTH QUESTIONNAIRE - PHQ9
SUM OF ALL RESPONSES TO PHQ9 QUESTIONS 1 & 2: 0
SUM OF ALL RESPONSES TO PHQ QUESTIONS 1-9: 0
SUM OF ALL RESPONSES TO PHQ QUESTIONS 1-9: 0
2. FEELING DOWN, DEPRESSED OR HOPELESS: 0
SUM OF ALL RESPONSES TO PHQ QUESTIONS 1-9: 0
1. LITTLE INTEREST OR PLEASURE IN DOING THINGS: 0
SUM OF ALL RESPONSES TO PHQ QUESTIONS 1-9: 0
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
## 2022-11-04 NOTE — PROGRESS NOTES
Patient comes in today for 6 week post partum visit.      Delivery Method: Vaginal    Delivery Date: 09/23/2022    Birth Control: none, declines at this time    Breast/Bottle: breast    Bleeding: Yes    Baby: Doing well    Bowel/Bladder: No issues    Blues: None    Last pap smear:  12/2020    Vitals:    11/04/22 1051   BP: 110/70   Site: Left Upper Arm   Position: Sitting   Weight: 132 lb (59.9 kg)   Height: 5' 5\" (1.651 m)         Opal Spence MA  11:02 AM  11/04/22

## 2022-11-10 LAB
C TRACH RRNA CVX QL NAA+PROBE: NEGATIVE
CYTOLOGIST CVX/VAG CYTO: NORMAL
CYTOLOGY CVX/VAG DOC THIN PREP: NORMAL
HPV REFLEX: NORMAL
Lab: NORMAL
N GONORRHOEA RRNA CVX QL NAA+PROBE: NEGATIVE
PATH REPORT.FINAL DX SPEC: NORMAL
STAT OF ADQ CVX/VAG CYTO-IMP: NORMAL

## 2022-12-15 ENCOUNTER — TELEPHONE (OUTPATIENT)
Dept: OBGYN CLINIC | Age: 27
End: 2022-12-15

## 2022-12-15 DIAGNOSIS — N39.46 URINARY INCONTINENCE, MIXED: ICD-10-CM

## 2022-12-15 NOTE — TELEPHONE ENCOUNTER
Pt lvm stating she would like a referral for a pelvic floor therapist. Pt stated she has a hard time holding her urine and she urinates a little when she sneezes. I will call pt back when referral is sent.

## 2022-12-21 ENCOUNTER — TELEPHONE (OUTPATIENT)
Dept: OBGYN CLINIC | Age: 27
End: 2022-12-21

## 2022-12-21 NOTE — TELEPHONE ENCOUNTER
RN called patient back, patient verified PHIs, patient stated they were wondering about pelvic PT therapy, RN stated that referral was placed, RN proceeded to give patient phone number of therapy office to book appt. And check on referral. Patient repeated phone number and verbalized understanding.

## 2023-01-16 ENCOUNTER — TELEPHONE (OUTPATIENT)
Dept: OBGYN CLINIC | Age: 28
End: 2023-01-16

## 2023-01-16 NOTE — TELEPHONE ENCOUNTER
Pt lvm asking for a phone number. Called pt back and she stated she wanted a number for a Dula for her friend. Advised pt that she would have to contact gene because we do not do those under [address] [place] [address]. Pt asked to talk to someone else so I gave the phone Opal.

## 2023-01-16 NOTE — TELEPHONE ENCOUNTER
Called pt, no answer, lvm stating that we had found a number for labor and delivery and that they would know who she used for a dula I gave the number [phone].

## 2023-01-20 NOTE — THERAPY EVALUATION
Luz Maria Mcdonnell  : 1995  Primary:  (No info available)  Secondary:  [facility] [address]  [address] [place] [address] Way [address]  Phone: [phone]  Fax: [phone] Plan Frequency: 1x/week for 90 days  Plan of Care/Certification Expiration Date: 23    PT Visit Info:  Plan Frequency: 1x/week for 90 days  Plan of Care/Certification Expiration Date: 23  Total # of Visits to Date: 1  Progress Note Due Date: 23    Visit Count:  1                OUTPATIENT PHYSICAL THERAPY:             OP NOTE TYPE: Initial Assessment 2023               Episode (PFPT) Appt Desk         Treatment Diagnosis:  Lack of coordination (muscle incoordination) (R27.8)  Stress incontinence (female) (male) (N39.3)  Feeling of incomplete bladder emptying (R39.14)  Post-void dribbling (N39.43)  Medical/Referring Diagnosis:  Urinary incontinence, mixed [N39.46]  Referring Physician:  Ciarra Jacinto MD MD Orders:  PT Eval and Treat   Return MD Appt:  TBD  Date of Onset:       Allergies:  Patient has no known allergies. Restrictions/Precautions:    Restrictions/Precautions: None  Required Braces or Orthoses?: No      Medications Last Reviewed:  2023     SUBJECTIVE   History of Injury/Illness (Reason for Referral):  Ms. Yamilet Harvey is a 23 yo female referred to pelvic floor physical therapy (PFPT) by Ciarra Jacinto, * 2/2 Urinary incontinence, mixed [N39.46] Pt reports that symptoms began after her baby 4 months ago. Patient reports that standing up from the toilet after peeing she pees a little bit more when she wipes. She also has leakage with cough or sneeze this is a small amount. This doesn't happen every time she coughs or sneezes. She isn't sure if having a full bladder matters   Denies urgency and UUI     Numbness/tingling in the feet when she wakes up in the morning.  She is seeing a chiropractor   Denies back or hip pain       exercise: walks with the family         Urinary: Frequency 5-10 x/day, 0 x/night. Positive for stress urinary incontinence and urinary hesitancy/intermittency. Negative for urge urinary incontinence, urinary urgency, urinary frequency, incomplete emptying, dysuria, hematuria, nocturia, and nocturnal enuresis. Pt does use pads for urinary protection; 2 pads per day (PPD), is wearing it for the discharge. Fluid intake: 160 oz water/day; bladder irritants include: coffee x 2 cups . Pt does not limit fluid intake due to bladder control. Bowel: Frequency 1x/day. Positive for  none . Negative for pain with bowel movement, pushing/straining with bowel movement, fecal incontinence, constipation, and incomplete emptying. Pt does not use pads for bowel protection; 0 pads per day (PPD). Use of stool softeners or laxatives? No    Sexual: Pt is  sexually active with male partners. Contraception/birth control: none. negative for dyspareunia. History of sexual abuse: No    OB History: Number of pregnancies: 2 Number of vaginal deliveries: 2 Number of c-sections: 0. No tearing or complications to report    Patient Stated Goal(s):  \"to get my pelvic floor strong again \"  Initial:     0/10 Post Session:     0/10  Past Medical History/Comorbidities:   Ms. [place] Isabel  has a past medical history of PTSD (post-traumatic stress disorder). Ms. [place] Isabel  has no past surgical history on file. Social History/Living Environment:   Lives With: Spouse; Family     Prior Level of Function/Work/Activity:   Current level of function: IND  Type of Occupation: none           Learning:   Does the patient/guardian have any barriers to learning?: No barriers  Will there be a co-learner?: No  What is the preferred language of the patient/guardian?: English  Is an  required?: No  How does the patient/guardian prefer to learn new concepts?: Listening; Reading; Demonstration; Pictures/Videos     Fall Risk Scale:    Caceres Total Score: 0  Caceres Fall Risk: Low (0-24)           OBJECTIVE   External Observations:  Voluntary contraction: [x] absent     [] present  Voluntary relaxation: [x] absent     [] present  Involuntary contraction: [x] absent     [] present  Involuntary relaxation: [x] absent     [] present  Perineal descent at rest: [x] absent     [] present  Perineal descent with bearing: [x] absent     [] present  Skin integrity: WNL  Postural assessment: Forward Head Posture, Rounded Shoulders, and sacral sitting  Gait: WNL    Pelvic Floor Muscle (PFM) Assessment:  Vaginal vault size: [] decreased     [] increased     [x] WNL  Muscle volume: [] decreased     [x] WNL     [] Defect  PFM tension: [] decreased     [x] increased     [] WNL    Contraction ability:  Voluntary contraction: [] absent     [] weak     [x] moderate      [] strong  Voluntary relaxation: [] absent     [x] partial   [] complete   [x] delayed    [x] incomplete    MMT: 3/5   PFM endurance: 10 seconds   Repetitions of endurance contractions: 3  Number of quick contractions in 10 seconds: 3  Quality of contractions: fair  Overflow: [x] absent     [] min     [] mod     [] severe / Compensatory mm groups include none    Tissue laxity test:  Anterior wall: [] minimum     [] moderate     [] severe      [x] WNL  Posterior wall: [] minimum     [] moderate     [] severe      [x] WNL    Palpation: via vaginal canal   Superficial Pelvic Floor Musculature (PFM): Tender? Intermediate PFM Tender? Deep PFM Tender?    Superficial Transverse Perineal [] Right  [] Left  []DNT Deep Transverse Perineal [] Right  [] Left  []DNT Puborectalis [] Right  [] Left  []DNT   Ischiocavernosus [] Right  [x] Left  []DNT Compressor Urethra [] Right  [] Left  []DNT Pubococcygeus [] Right  [] Left  []DNT   Bulbocavernosus [] Right  [x] Left  []DNT   Iliococcygeus [] Right  [] Left  []DNT       Obturator Internus [] Right  [] Left  []DNT       Coccygeus [] Right  [] Left  []DNT         Breath assessment:  Observation: chest breathing dominant  Coordination of pelvic floor muscles with breath: minimal pelvic floor muscle excursion  Co-contraction of PFM with transversus abdominis: absent    ASSESSMENT   Initial Assessment:  Mike Edmondson presents with musculoskeletal limitations including pelvic floor muscle (PFM) tension, reduced PFM Range of Motion (ROM), increased tenderness to palpation of the PFM, poor posture, reduced coordination and awareness of PFM, poor diaphragm excursion, and decreased pelvic floor muscle (PFM) strength. These limitations are impairing the patient's ability to properly coordinate perineal elevation and descent for normalized PFM function, contributing to urinary dysfunction. As a result, she is limited in her/his ability to participate in household chores, physical activities such as walking, swimming, or other exercise, entertainment activities such as going to a movie or concert, traveling by car or bus for a distance greater then 30 minutes away from home, and social activities outside of the home. Problem List: (Impacting functional limitations): Body Structures, Functions, Activity Limitations Requiring Skilled Therapeutic Intervention: Decreased ROM; Decreased strength; Decreased coordination; Increased pain; Decreased posture     Therapy Prognosis:   Therapy Prognosis: Good     Initial Assessment Complexity:   Decision Making: Low Complexity    PLAN   Effective Dates: 1/25/23 TO Plan of Care/Certification Expiration Date: 04/25/23 04/25/23    Frequency/Duration: Plan Frequency: 1x/week for 90 days   Interventions Planned (Treatment may consist of any combination of the following):    Current Treatment Recommendations: Strengthening; ROM; Endurance training; Neuromuscular re-education; Manual; Pain management; Home exercise program; Patient/Caregiver education & training; Modalities;  Therapeutic activities     Goals: (Goals have been discussed and agreed upon with patient.)  Short-Term Functional Goals: Time Frame: 6 weeks  Patient will demonstrate independence with basic pelvic floor muscle (PFM) home exercises program (HEP) to improve awareness, coordination, and timing of PFM. Patient will demonstrate understanding of and ability to teach back appropriate water intake, bladder irritants, toileting frequency, and positioning for improved self-management of symptoms. Patient will demonstrate appropriate use of the pelvic floor muscle group (quick flicks and/or drops), without compensation, to implement urge suppression appropriately with urgency of urination and decrease the number of pads per day or UI episodes by 50%. Discharge Goals: Time Frame: 12 weeks  Patient will demonstrate ability to voluntarily contract the pelvic floor muscles prior to a cough or sneeze for decreased leakage during increases in intra-abdominal pressure. Patient will demonstrate independence with an advanced HEP for general conditioning, core stabilization, and mobility to facilitate carry over and independent management of symptoms. Outcome Measure:   Pelvic Floor Outcome Measure:  Pelvic Floor Impact Questionnaire--short form 7 (PFIQ-7):  Score:  Initial: 74  Bladder or Urine: 80/100  Bowel or Rectum: 71/100  Vagina or Pelvis: 71/100 Most Recent: X (Date: -- )  Bladder or Urine: X/100  Bowel or Rectum: X/100  Vagina or Pelvis: X/100   Interpretation of Score: Each of the 7 sections is scored on a scale from 0-3; 0 representing \"Not at all\", 3 representing \"Quite a bit\". The mean value is taken from all the answered items, then multiplied by 100 to obtain the scale score, ranging from 0-100. This process is repeated for each column representing bowel, bladder, and pelvic pain. Medical Necessity:   > Skilled intervention continues to be required due to above mentioned deficits.   Reason For Services/Other Comments:  > Patient continues to require skilled intervention due to above mentioned deficits. Total Duration: 40 minutes  Time In: 0800  Time Out: 0840    Regarding Trudy Rodriguez's therapy, I certify that the treatment plan above will be carried out by a therapist or under their direction.   Thank you for this referral,  Bethanie Prieto, PT     Referring Physician Signature: Nicky Carreno, * _______________________________ Date _____________        Post Session Pain  Charge Capture  PT Visit Info MD Guidelines  MyChart

## 2023-01-20 NOTE — PROGRESS NOTES
Savannah Hunt  : 1995  Primary:  (No info available)  Secondary:  [facility] [address] [address]  Phone: [phone]  Fax: [phone] Plan Frequency: 1x/week for 90 days    Plan of Care/Certification Expiration Date: 23    PT Visit Info:  Plan Frequency: 1x/week for 90 days  Plan of Care/Certification Expiration Date: 23  Total # of Visits to Date: 1  Progress Note Due Date: 23      Visit Count:  1    OUTPATIENT PHYSICAL THERAPY:OP NOTE TYPE: Treatment Note 2023       Episode  }Appt Desk             Treatment Diagnosis:  Lack of coordination (muscle incoordination) (R27.8)  Stress incontinence (female) (male) (N39.3)  Feeling of incomplete bladder emptying (R39.14)  Post-void dribbling (N39.43)  Medical/Referring Diagnosis:  Urinary incontinence, mixed [N39.46]  Referring Physician:  Benjamin Weeks MD MD Orders:  PT Eval and Treat   Date of Onset:  No data recorded   Allergies:   Patient has no known allergies. Restrictions/Precautions:  Restrictions/Precautions: None  Required Braces or Orthoses?: No  No data recorded     Interventions Planned (Treatment may consist of any combination of the following):    Current Treatment Recommendations: Strengthening; ROM; Endurance training; Neuromuscular re-education; Manual; Pain management; Home exercise program; Patient/Caregiver education & training; Modalities; Therapeutic activities     Subjective Comments: post void dribbling, CAROLINE, tension     Initial:}    0/10Post Session:       0/10  Medications Last Reviewed:  2023  Updated Objective Findings:  See evaluation note from today  Treatment   THERAPEUTIC ACTIVITY: ( 0 minutes): Functional activity education regarding anatomy, pathology and role of pelvic floor muscle (PFM) function in relation to presenting symptoms and role of pelvic floor therapy in conservative treatment.  and Instruction on coordinated pelvic floor and diaphragmatic breathing to improve kinesthetic awareness of pelvic muscle mobility and restore proper motor recruitment patterns with breathing, posture, and functional movement (e.g. appropriate lift/contraction with increased IAP such as a cough, laugh, sneeze to prevent incontinence as well as appropriate relaxation/drop to reduce pain with vaginal penetration). TA Educational Topic Notes Date Completed   Pathology/Anatomy/PFM Function Reviewed  1/25/23   Bladder health education     Urinary urge suppression     The knack     Voiding strategies     Nocturia tips     Bowel/Bladder log     Bowel health education     Constipation care     Diarrhea/Fecal leakage     Colonic massage     Toilet positioning     Defecation dynamics     Sources of fiber     Return to intercourse/Dilator training     Sexual positioning     Lubricants/vaginal moisturizers     Vulvovaginal health/vaginal irritants     Body mechanics     Posture/ergonomics     Diaphragmatic breathing Reviewed with patient and practiced  1/25/23   Resources and technology     Other patient education       THERAPEUTIC EXERCISE: ( minutes):    Exercises per grid below to improve mobility, strength, balance, and coordination. Required moderate visual, verbal, and tactile cues to promote proper body alignment, promote proper body posture, promote proper body mechanics, and promote proper body breathing techniques. Progressed resistance, range, repetitions, and complexity of movement as indicated. Date:   Date:   Date:     Activity/Exercise Parameters Parameters Parameters                       MANUAL THERAPY: ( minutes): Soft tissue mobilization was utilized and necessary because of the patient's painful spasm and restricted motion of soft tissue.    Date Type Location Comments   1/25/2023 Internal assessment/treatment Via vaginal canal completed                                       (Used abbreviations: MET - muscle energy technique; SCS- Strain counter strain; CTM-Connective tissue mobilizations; CR- Contract/Relax; SP- Sustained pressure; PIT- Positional inhibition techniques; STM Soft -tissue mobilization; MM- Myofascial mobilization; TrP-Trigger point release; IASTM- Instrument assisted soft tissue mobilizations, TDN-Trigger point dry needling)    Pt gives verbal consent to internal vaginal assessment/treatment without chaperon present. Treatment/Session Summary:    Treatment Assessment:Pt reports good understanding of plan of care, as well as prescribed home exercise program. All questions were answered to pts satisfaction. Pt was invited to call with any further questions or concerns. Communication/Consultation:  None today  Equipment provided today:  None  Recommendations/Intent for next treatment session: Next visit will focus on   1. MSK exam   2. Biofeedback assessment for tension   3. Toilet positioning and body mechanics .     Total Treatment Billable Duration:  40 minutes  Time In: 0800  Time Out: 0840    Jennifer Sauceda PT       Charge Capture  }Post Session Pain  PT Visit Info  [employer] Portal  MD Guidelines  Scanned Media  Benefits  MyChart    Future Appointments   Date Time Provider Saniya Laila   11/15/2023 10:00 AM David Sharma MD [facility]

## 2023-01-25 ENCOUNTER — HOSPITAL ENCOUNTER (OUTPATIENT)
Dept: PHYSICAL THERAPY | Age: 28
Setting detail: RECURRING SERIES
Discharge: HOME | End: 2023-01-28
Payer: MEDICAID

## 2023-01-25 PROCEDURE — 97161 PT EVAL LOW COMPLEX 20 MIN: CPT

## 2023-01-25 ASSESSMENT — PAIN SCALES - GENERAL: PAINLEVEL_OUTOF10: 0

## 2024-02-21 ENCOUNTER — HOSPITAL ENCOUNTER (EMERGENCY)
Age: 29
Discharge: HOME | End: 2024-02-21
Attending: EMERGENCY MEDICINE

## 2024-02-21 ENCOUNTER — APPOINTMENT (OUTPATIENT)
Dept: CT IMAGING | Age: 29
End: 2024-02-21

## 2024-02-21 VITALS
HEIGHT: 65 IN | TEMPERATURE: 98.6 F | HEART RATE: 85 BPM | RESPIRATION RATE: 16 BRPM | DIASTOLIC BLOOD PRESSURE: 77 MMHG | OXYGEN SATURATION: 97 % | BODY MASS INDEX: 19.16 KG/M2 | WEIGHT: 115 LBS | SYSTOLIC BLOOD PRESSURE: 127 MMHG

## 2024-02-21 DIAGNOSIS — N93.9 VAGINAL BLEEDING: ICD-10-CM

## 2024-02-21 DIAGNOSIS — R10.9 ABDOMINAL PAIN, UNSPECIFIED ABDOMINAL LOCATION: Primary | ICD-10-CM

## 2024-02-21 DIAGNOSIS — N83.209 RUPTURED OVARIAN CYST: ICD-10-CM

## 2024-02-21 LAB
ALBUMIN SERPL-MCNC: 4.5 G/DL (ref 3.5–5)
ALBUMIN/GLOB SERPL: 1.1 (ref 0.4–1.6)
ALP SERPL-CCNC: 64 U/L (ref 50–130)
ALT SERPL-CCNC: 22 U/L (ref 12–65)
ANION GAP SERPL CALC-SCNC: 4 MMOL/L (ref 2–11)
AST SERPL-CCNC: 16 U/L (ref 15–37)
BASOPHILS # BLD: 0 K/UL (ref 0–0.2)
BASOPHILS NFR BLD: 0 % (ref 0–2)
BILIRUB SERPL-MCNC: 1.4 MG/DL (ref 0.2–1.1)
BILIRUB UR QL: NEGATIVE
BUN SERPL-MCNC: 9 MG/DL (ref 6–23)
CALCIUM SERPL-MCNC: 9.1 MG/DL (ref 8.3–10.4)
CHLORIDE SERPL-SCNC: 107 MMOL/L (ref 103–113)
CO2 SERPL-SCNC: 26 MMOL/L (ref 21–32)
CREAT SERPL-MCNC: 0.91 MG/DL (ref 0.6–1)
DIFFERENTIAL METHOD BLD: ABNORMAL
EOSINOPHIL # BLD: 0.2 K/UL (ref 0–0.8)
EOSINOPHIL NFR BLD: 2 % (ref 0.5–7.8)
ERYTHROCYTE [DISTWIDTH] IN BLOOD BY AUTOMATED COUNT: 11.9 % (ref 11.9–14.6)
GLOBULIN SER CALC-MCNC: 4.1 G/DL (ref 2.8–4.5)
GLUCOSE SERPL-MCNC: 127 MG/DL (ref 65–100)
GLUCOSE UR QL STRIP.AUTO: NEGATIVE MG/DL
HCG, URINE, POC: NEGATIVE
HCT VFR BLD AUTO: 41.5 % (ref 35.8–46.3)
HGB BLD-MCNC: 14.3 G/DL (ref 11.7–15.4)
IMM GRANULOCYTES # BLD AUTO: 0 K/UL (ref 0–0.5)
IMM GRANULOCYTES NFR BLD AUTO: 0 % (ref 0–5)
KETONES UR-MCNC: NEGATIVE MG/DL
LACTATE SERPL-SCNC: 1 MMOL/L (ref 0.4–2)
LEUKOCYTE ESTERASE UR QL STRIP: NEGATIVE
LIPASE SERPL-CCNC: 236 U/L (ref 73–393)
LYMPHOCYTES # BLD: 2.1 K/UL (ref 0.5–4.6)
LYMPHOCYTES NFR BLD: 21 % (ref 13–44)
Lab: NORMAL
MCH RBC QN AUTO: 29.5 PG (ref 26.1–32.9)
MCHC RBC AUTO-ENTMCNC: 34.5 G/DL (ref 31.4–35)
MCV RBC AUTO: 85.6 FL (ref 82–102)
MONOCYTES # BLD: 0.6 K/UL (ref 0.1–1.3)
MONOCYTES NFR BLD: 6 % (ref 4–12)
NEGATIVE QC PASS/FAIL: NORMAL
NEUTS SEG # BLD: 7.1 K/UL (ref 1.7–8.2)
NEUTS SEG NFR BLD: 71 % (ref 43–78)
NITRITE UR QL: NEGATIVE
NRBC # BLD: 0 K/UL (ref 0–0.2)
PH UR: 6.5 (ref 5–9)
PLATELET # BLD AUTO: 341 K/UL (ref 150–450)
PMV BLD AUTO: 9 FL (ref 9.4–12.3)
POSITIVE QC PASS/FAIL: NORMAL
POTASSIUM SERPL-SCNC: 3.6 MMOL/L (ref 3.5–5.1)
PROT SERPL-MCNC: 8.6 G/DL (ref 6.3–8.2)
PROT UR QL: NEGATIVE MG/DL
RBC # BLD AUTO: 4.85 M/UL (ref 4.05–5.2)
RBC # UR STRIP: ABNORMAL
SERVICE CMNT-IMP: ABNORMAL
SODIUM SERPL-SCNC: 137 MMOL/L (ref 136–146)
SP GR UR: 1.01 (ref 1–1.02)
UROBILINOGEN UR QL: 0.2 EU/DL (ref 0.2–1)
WBC # BLD AUTO: 10 K/UL (ref 4.3–11.1)

## 2024-02-21 PROCEDURE — 6360000002 HC RX W HCPCS: Performed by: STUDENT IN AN ORGANIZED HEALTH CARE EDUCATION/TRAINING PROGRAM

## 2024-02-21 PROCEDURE — 96374 THER/PROPH/DIAG INJ IV PUSH: CPT

## 2024-02-21 PROCEDURE — 85025 COMPLETE CBC W/AUTO DIFF WBC: CPT

## 2024-02-21 PROCEDURE — 74177 CT ABD & PELVIS W/CONTRAST: CPT

## 2024-02-21 PROCEDURE — 96375 TX/PRO/DX INJ NEW DRUG ADDON: CPT

## 2024-02-21 PROCEDURE — 81003 URINALYSIS AUTO W/O SCOPE: CPT

## 2024-02-21 PROCEDURE — 6360000004 HC RX CONTRAST MEDICATION: Performed by: STUDENT IN AN ORGANIZED HEALTH CARE EDUCATION/TRAINING PROGRAM

## 2024-02-21 PROCEDURE — 83605 ASSAY OF LACTIC ACID: CPT

## 2024-02-21 PROCEDURE — 99285 EMERGENCY DEPT VISIT HI MDM: CPT

## 2024-02-21 PROCEDURE — 80053 COMPREHEN METABOLIC PANEL: CPT

## 2024-02-21 PROCEDURE — 83690 ASSAY OF LIPASE: CPT

## 2024-02-21 RX ORDER — ONDANSETRON 2 MG/ML
4 INJECTION INTRAMUSCULAR; INTRAVENOUS
Status: COMPLETED | OUTPATIENT
Start: 2024-02-21 | End: 2024-02-21

## 2024-02-21 RX ORDER — MORPHINE SULFATE 4 MG/ML
4 INJECTION INTRAVENOUS ONCE
Status: COMPLETED | OUTPATIENT
Start: 2024-02-21 | End: 2024-02-21

## 2024-02-21 RX ADMIN — IOPAMIDOL 100 ML: 755 INJECTION, SOLUTION INTRAVENOUS at 16:50

## 2024-02-21 RX ADMIN — ONDANSETRON 4 MG: 2 INJECTION INTRAMUSCULAR; INTRAVENOUS at 14:33

## 2024-02-21 RX ADMIN — MORPHINE SULFATE 4 MG: 4 INJECTION, SOLUTION INTRAMUSCULAR; INTRAVENOUS at 14:33

## 2024-02-21 ASSESSMENT — ENCOUNTER SYMPTOMS
ABDOMINAL PAIN: 1
TROUBLE SWALLOWING: 0
COUGH: 0
PHOTOPHOBIA: 0
VOMITING: 0
FACIAL SWELLING: 0
SHORTNESS OF BREATH: 0
NAUSEA: 1

## 2024-02-21 ASSESSMENT — PAIN DESCRIPTION - LOCATION: LOCATION: ABDOMEN

## 2024-02-21 ASSESSMENT — PAIN SCALES - GENERAL
PAINLEVEL_OUTOF10: 7
PAINLEVEL_OUTOF10: 8

## 2024-02-21 ASSESSMENT — PAIN - FUNCTIONAL ASSESSMENT: PAIN_FUNCTIONAL_ASSESSMENT: 0-10

## 2024-02-21 NOTE — ED PROVIDER NOTES
Emergency Department Provider Note       PCP: Not, On File (Inactive)   Age: 28 y.o.   Sex: female     DISPOSITION         ICD-10-CM    1. Abdominal pain, unspecified abdominal location  R10.9       2. Vaginal bleeding  N93.9           Medical Decision Making     5:07 PM EST The patient's care will be transitioned to Dr. Pastrana.  At this time, the plan is disposition pending CT.  This patient is well-appearing at this time.  Labs have been unremarkable.  No signs of infection on urine.  Discussed with provider that she may require pelvic ultrasound pending CT results.  Please see that provider's documentation for further information.      ED Course as of 02/21/24 1707   Wed Feb 21, 2024   1453 2:53 PM  Patient reports the morphine is completely resolved the pain.  Repeat abdominal exam does not show any tenderness [NR]   1605 4:05 PM  Repeat abdominal exam benign.  States she is feeling well. [NR]      ED Course User Index  [NR] Haroon Snyder PA     1 acute complicated illness or injury.  Parental controlled substances given in the ED.  Drug therapy given requiring intensive monitoring for toxicity.    I independently ordered and reviewed each unique test.                     History     28-year-old female patient presents today complaining of abdominal cramping began yesterday around 5:30 PM and has remained constant.  States it does comes in waves.  Describes it as feeling similar to period cramps.  Does report she has been on her period for the past 12 days although describes the flow as brownish and very light.  She states for the past 3 months has been having irregular periods and they have been much longer than usual.  She denies any fevers.  She does report some nausea.  Denies emesis.  No chest pain or shortness of breath or denies diarrhea or blood in stool or melena.  Denies dysuria or urinary frequency.  Patient is primary historian.    The history is provided by the patient. No language

## 2024-05-07 ENCOUNTER — OFFICE VISIT (OUTPATIENT)
Dept: OBGYN CLINIC | Age: 29
End: 2024-05-07
Payer: COMMERCIAL

## 2024-05-07 VITALS
BODY MASS INDEX: 20.12 KG/M2 | DIASTOLIC BLOOD PRESSURE: 78 MMHG | SYSTOLIC BLOOD PRESSURE: 124 MMHG | WEIGHT: 120.8 LBS | HEIGHT: 65 IN

## 2024-05-07 DIAGNOSIS — N92.6 IRREGULAR MENSES: Primary | ICD-10-CM

## 2024-05-07 DIAGNOSIS — N94.10 DYSPAREUNIA IN FEMALE: ICD-10-CM

## 2024-05-07 DIAGNOSIS — N92.6 IRREGULAR MENSES: ICD-10-CM

## 2024-05-07 PROBLEM — N39.46 URINARY INCONTINENCE, MIXED: Status: RESOLVED | Noted: 2022-12-15 | Resolved: 2024-05-07

## 2024-05-07 LAB
HCG, PREGNANCY, URINE, POC: NEGATIVE
VALID INTERNAL CONTROL, POC: YES

## 2024-05-07 PROCEDURE — 99459 PELVIC EXAMINATION: CPT | Performed by: OBSTETRICS & GYNECOLOGY

## 2024-05-07 PROCEDURE — 81025 URINE PREGNANCY TEST: CPT | Performed by: OBSTETRICS & GYNECOLOGY

## 2024-05-07 PROCEDURE — 99213 OFFICE O/P EST LOW 20 MIN: CPT | Performed by: OBSTETRICS & GYNECOLOGY

## 2024-05-07 SDOH — ECONOMIC STABILITY: HOUSING INSECURITY
IN THE LAST 12 MONTHS, WAS THERE A TIME WHEN YOU DID NOT HAVE A STEADY PLACE TO SLEEP OR SLEPT IN A SHELTER (INCLUDING NOW)?: NO

## 2024-05-07 SDOH — ECONOMIC STABILITY: FOOD INSECURITY: WITHIN THE PAST 12 MONTHS, THE FOOD YOU BOUGHT JUST DIDN'T LAST AND YOU DIDN'T HAVE MONEY TO GET MORE.: NEVER TRUE

## 2024-05-07 SDOH — ECONOMIC STABILITY: FOOD INSECURITY: WITHIN THE PAST 12 MONTHS, YOU WORRIED THAT YOUR FOOD WOULD RUN OUT BEFORE YOU GOT MONEY TO BUY MORE.: NEVER TRUE

## 2024-05-07 SDOH — ECONOMIC STABILITY: INCOME INSECURITY: HOW HARD IS IT FOR YOU TO PAY FOR THE VERY BASICS LIKE FOOD, HOUSING, MEDICAL CARE, AND HEATING?: NOT HARD AT ALL

## 2024-05-07 NOTE — ASSESSMENT & PLAN NOTE
D/W pt poss nml variant since  for 1 year (just stopped end of last year), poss endometriosis  D/W her presumptive treatment with OCPs, POPs - pt declined  Also discussed IUD, Nuvaring - she again declined  Offered pelvic US - declined  Pt will cont obs and let me know if she desires any form of treatment

## 2024-05-07 NOTE — PROGRESS NOTES
Patient here for concern of irregular menses.   Patient states she has a hx of irregular menses but regulated a year after her baby was born for 2-3 cycles.  Patient states that over the last 4-5 months her menses has become irregular again where they come on for anywhere from 12-17 days.     LAST PAP:  11/4/2022, neg.    LAST MAMMO:  never     LMP:  Patient's last menstrual period was 04/23/2024 (exact date).    BIRTH CONTROL:  none    TOBACCO USE:  No    FAMILY HISTORY OF:   Breast Cancer:  No   Ovarian Cancer:  No   Uterine Cancer:  No   Colon Cancer:  No    Vitals:    05/07/24 1047   BP: 124/78   Site: Left Upper Arm   Position: Sitting   Weight: 54.8 kg (120 lb 12.8 oz)   Height: 1.651 m (5' 5\")        YOLANDA GODINEZ RN  05/07/24  10:56 AM

## 2024-05-07 NOTE — PROGRESS NOTES
Chaperone for Intimate Exam     Chaperone was offer accepted as part of the rooming process    Chaperone: Sadie Cueto

## 2024-05-07 NOTE — PATIENT INSTRUCTIONS
We will call you if anything is abnormal from your testing today.   Follow up as needed or in November for your yearly female exam.  Thanks for coming to see us today and letting us take care of you!

## 2024-05-07 NOTE — PROGRESS NOTES
[facility]  [address], [place] [address]  [phone]    Bob Ramirez MD, FACOG  Mai Malone [facility]  Mandy Hernandez MD, FACOG    Assessment/Plan     Patient Active Problem List    Diagnosis Date Noted    Irregular menses 2024     Overview Note:     noted       Assessment & Plan Note:     D/W pt poss nml variant since  for 1 year (just stopped end of last year), poss endometriosis  D/W her presumptive treatment with OCPs, POPs - pt declined  Also discussed IUD, Nuvaring - she again declined  Offered pelvic US - declined  Pt will cont obs and let me know if she desires any form of treatment      Dyspareunia in female 2024     Overview Note:     noted       Assessment & Plan Note:     See A&P under irreg menses      Carrier of spinal muscular atrophy 2022     Overview Note:     2022: Offered FOB testing and genetic counseling           Problem List Items Addressed This Visit       Irregular menses - Primary     D/W pt poss nml variant since  for 1 year (just stopped end of last year), poss endometriosis  D/W her presumptive treatment with OCPs, POPs - pt declined  Also discussed IUD, Nuvaring - she again declined  Offered pelvic US - declined  Pt will cont obs and let me know if she desires any form of treatment         Relevant Orders    AMB POC URINE PREGNANCY TEST, VISUAL COLOR COMPARISON    Nuswab Vaginitis Plus (VG+)    Dyspareunia in female     See A&P under irreg menses         Relevant Orders    Nuswab Vaginitis Plus (VG+)        Subjective     LAST PAP:  2022, neg.     LAST MAMMO:  never      LMP:  Patient's last menstrual period was 2024 (exact date).     BIRTH CONTROL:  none     TOBACCO USE:  No    Kayla Rodriguez 28 y.o.  presents today for irreg menses.  Pt report stopping nursing late last year and this has been an issue since.  She notes bleeding approx every 2 weeks.  (+) occasional dyspareunia. Denies any

## 2024-07-22 ENCOUNTER — LAB (OUTPATIENT)
Dept: OBGYN CLINIC | Age: 29
End: 2024-07-22

## 2024-07-22 ENCOUNTER — TELEPHONE (OUTPATIENT)
Dept: OBGYN CLINIC | Age: 29
End: 2024-07-22

## 2024-07-22 DIAGNOSIS — O26.859 SPOTTING IN EARLY PREGNANCY: ICD-10-CM

## 2024-07-22 DIAGNOSIS — O26.859 SPOTTING IN EARLY PREGNANCY: Primary | ICD-10-CM

## 2024-07-22 LAB — HCG SERPL-ACNC: 9615 MIU/ML

## 2024-07-22 NOTE — TELEPHONE ENCOUNTER
Pt LVM stating she is a new pt, with an appt 07/31/24 with MIKAEL. Has not established care with our practice. States she has irregular/long menses, has been bleeding since 06/28/24. Got pos UPT yesterday/today and is having abdominal pain/tenderness.     Spoke with pt. Relayed that we cannot give medical advice, since she is not our patient, but that if she feels her condition is urgent or emergent, she should go to the ER for evaluation. Pt verbalized understanding.

## 2024-07-22 NOTE — TELEPHONE ENCOUNTER
Patient called about spotting since around her approx LMP 6/29/2024.     Positive UPT: 7/21/2024   Pain: none     Patient is asking what she can do about the spotting.     Reviewed with provider-schedule b[facility]cg for Tuesday and Thursday.   Updated patient, patient will be out of town on Thursday, scheduled b[facility]cg for today and Wednesday.

## 2024-07-25 ENCOUNTER — TELEPHONE (OUTPATIENT)
Dept: OBGYN CLINIC | Age: 29
End: 2024-07-25

## 2024-07-25 NOTE — TELEPHONE ENCOUNTER
Called patient, updated her on cg result and that I can schedule NOB appointment for 8/5/24 or she can keep her [facility] appointment for 7/31/2024 and call them to let them know it will be a NOB appointment instead for irregular periods.     Patient verbalized understanding and states she will think on this and will let us know her decision on where her NOB appointment will be.     Updated  staff on this and that patient can schedule NOB appointment for 8/5/2024 at 11:30 for visit and US but to have patient come at 11am for US if she calls back and wants NOB appointment with this office.

## 2024-07-25 NOTE — TELEPHONE ENCOUNTER
----- Message from Bob Ramirez MD sent at 7/25/2024  7:48 AM EDT -----  Please let pt know that her quant has risen appropriately.  She can come in at regular time for new OB visit or contact [facility] (she has appt with them next week) to change to NOB visit with them.

## 2024-07-25 NOTE — TELEPHONE ENCOUNTER
----- Message from Bob Ramirez MD sent at 7/25/2024  7:48 AM EDT -----  Please let pt know that her quant has risen appropriately.  She can come in at regular time for new OB visit or contact [facility] (she has appt with them next week) to change to NOB visit with them.

## 2024-07-26 ENCOUNTER — TELEPHONE (OUTPATIENT)
Dept: OBGYN CLINIC | Age: 29
End: 2024-07-26

## 2024-07-26 NOTE — TELEPHONE ENCOUNTER
Answered pt call for OB bleeding. Pt reports she is continuing to have VB in early pregnancy. Had HCGs done at [facility], since she is unable to get an appointment with us yet. HCG levels viewed in mychart. Pt given general bleeding precautions for ER, and scheduled for US/OV next week.
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
## 2024-07-29 ENCOUNTER — OFFICE VISIT (OUTPATIENT)
Dept: OBGYN CLINIC | Age: 29
End: 2024-07-29
Payer: COMMERCIAL

## 2024-07-29 VITALS
WEIGHT: 122.6 LBS | SYSTOLIC BLOOD PRESSURE: 130 MMHG | HEIGHT: 65 IN | DIASTOLIC BLOOD PRESSURE: 76 MMHG | BODY MASS INDEX: 20.43 KG/M2

## 2024-07-29 DIAGNOSIS — O09.899 HISTORY OF PRETERM DELIVERY, CURRENTLY PREGNANT: ICD-10-CM

## 2024-07-29 DIAGNOSIS — N93.9 VAGINAL BLEEDING: Primary | ICD-10-CM

## 2024-07-29 DIAGNOSIS — O20.9 VAGINAL BLEEDING AFFECTING EARLY PREGNANCY: ICD-10-CM

## 2024-07-29 DIAGNOSIS — N92.6 MISSED PERIOD: Primary | ICD-10-CM

## 2024-07-29 PROBLEM — O13.9 GESTATIONAL HYPERTENSION WITHOUT SIGNIFICANT PROTEINURIA, ANTEPARTUM: Status: ACTIVE | Noted: 2020-11-04

## 2024-07-29 PROBLEM — F43.10 PTSD (POST-TRAUMATIC STRESS DISORDER): Status: ACTIVE | Noted: 2021-02-04

## 2024-07-29 PROBLEM — O09.892 HISTORY OF PRETERM DELIVERY, CURRENTLY PREGNANT IN SECOND TRIMESTER: Status: ACTIVE | Noted: 2022-03-25

## 2024-07-29 LAB
ABO + RH BLD: NORMAL
BASOPHILS # BLD: 0.1 K/UL (ref 0–0.2)
BASOPHILS NFR BLD: 1 % (ref 0–2)
BLOOD GROUP ANTIBODIES SERPL: NORMAL
DIFFERENTIAL METHOD BLD: NORMAL
EOSINOPHIL # BLD: 0.1 K/UL (ref 0–0.8)
EOSINOPHIL NFR BLD: 1 % (ref 0.5–7.8)
ERYTHROCYTE [DISTWIDTH] IN BLOOD BY AUTOMATED COUNT: 12.2 % (ref 11.9–14.6)
HBV SURFACE AG SER QL: NONREACTIVE
HCT VFR BLD AUTO: 39.9 % (ref 35.8–46.3)
HCV AB SER QL: NONREACTIVE
HGB BLD-MCNC: 13.3 G/DL (ref 11.7–15.4)
HIV 1+2 AB+HIV1 P24 AG SERPL QL IA: NONREACTIVE
HIV 1/2 RESULT COMMENT: NORMAL
IMM GRANULOCYTES # BLD AUTO: 0 K/UL (ref 0–0.5)
IMM GRANULOCYTES NFR BLD AUTO: 0 % (ref 0–5)
LYMPHOCYTES # BLD: 1.7 K/UL (ref 0.5–4.6)
LYMPHOCYTES NFR BLD: 16 % (ref 13–44)
MCH RBC QN AUTO: 29.4 PG (ref 26.1–32.9)
MCHC RBC AUTO-ENTMCNC: 33.3 G/DL (ref 31.4–35)
MCV RBC AUTO: 88.3 FL (ref 82–102)
MONOCYTES # BLD: 0.6 K/UL (ref 0.1–1.3)
MONOCYTES NFR BLD: 5 % (ref 4–12)
NEUTS SEG # BLD: 7.9 K/UL (ref 1.7–8.2)
NEUTS SEG NFR BLD: 77 % (ref 43–78)
NRBC # BLD: 0 K/UL (ref 0–0.2)
PLATELET # BLD AUTO: 246 K/UL (ref 150–450)
PMV BLD AUTO: 10.4 FL (ref 9.4–12.3)
PROGEST SERPL-MCNC: 21 NG/ML
RBC # BLD AUTO: 4.52 M/UL (ref 4.05–5.2)
RUBV IGG SERPL IA-ACNC: 35.3 IU/ML
T PALLIDUM AB SER QL IA: NONREACTIVE
WBC # BLD AUTO: 10.3 K/UL (ref 4.3–11.1)

## 2024-07-29 PROCEDURE — 99204 OFFICE O/P NEW MOD 45 MIN: CPT | Performed by: OBSTETRICS & GYNECOLOGY

## 2024-07-29 PROCEDURE — 76830 TRANSVAGINAL US NON-OB: CPT | Performed by: OBSTETRICS & GYNECOLOGY

## 2024-07-29 ASSESSMENT — PATIENT HEALTH QUESTIONNAIRE - PHQ9
SUM OF ALL RESPONSES TO PHQ QUESTIONS 1-9: 0
SUM OF ALL RESPONSES TO PHQ9 QUESTIONS 1 & 2: 0
1. LITTLE INTEREST OR PLEASURE IN DOING THINGS: NOT AT ALL
2. FEELING DOWN, DEPRESSED OR HOPELESS: NOT AT ALL
SUM OF ALL RESPONSES TO PHQ QUESTIONS 1-9: 0

## 2024-07-29 NOTE — PROGRESS NOTES
NOB consult with pt and her two children. Labs today: None. Offered pt the option of CF, SMA, and Fragile X carrier testing. Pt previously had testing, neg CF, patient is a carrier for spinal muscular atrophy. Offered pt the option of genetic screening (1st screen vs Tetra vs NIPT). Pt desires NIPT, does NOT want to know the gender. Instructed pt on exercise/nutrition in pregnancy. Reviewed [facility] preg book. Advised pt on using [facility] for hospital needs and [facility] L&D for pregnancy related emergencies. IMP[employer] Program discussed with patient and sheet given to her during visit today. Encouraged her to inform us should she start having increase in depression/anxiety. Pt states understanding. NOB forms signed, scanned, and given to pt.     Vitals:  Weight: 121 lb  BMI: 20  BP: 98/68    Medical Hx: Gestational hypertension () per patient, after delivery. - History of  delivery () spontaneously at 31 weeks in G1 pregnancy. - Carrier of spinal muscular atrophy.  PTSD.    Surgical Hx: None.    Last Pap: 2022 WNL.    Pt OB c/o: None.     Fam hx any chromosomal or inheritable disorders: Patient is a carrier of spinal muscular atrophy.     COVID Vaccine: x0 per patient  Flu Vaccine: Discussed flu recommendations with patient. Patient declined flu vaccine.    OB hx: G1: 2020, 31w3d, Vag-Spont, Male, 1.87 kg (4 lb 2 oz), Living. Comments:  labor, Gestational hypertension.   G2: 2022, 37w4d, Vag-Spont, Female, 2.76 kg (6 lb 1 oz), Living.   G3: 2024, Current.     NV: Next appointment scheduled on  with Dr nAderson. Patient wants NIPT but does NOT want to know the gender.   Patient was previously

## 2024-07-31 LAB
BACTERIA SPEC CULT: NORMAL
SERVICE CMNT-IMP: NORMAL

## 2024-08-05 ENCOUNTER — NURSE ONLY (OUTPATIENT)
Dept: OBGYN CLINIC | Age: 29
End: 2024-08-05

## 2024-08-05 VITALS
HEIGHT: 65 IN | SYSTOLIC BLOOD PRESSURE: 98 MMHG | BODY MASS INDEX: 20.16 KG/M2 | WEIGHT: 121 LBS | DIASTOLIC BLOOD PRESSURE: 68 MMHG

## 2024-08-05 DIAGNOSIS — Z3A.01 7 WEEKS GESTATION OF PREGNANCY: ICD-10-CM

## 2024-08-05 DIAGNOSIS — Z34.91 NORMAL PREGNANCY, FIRST TRIMESTER: Primary | ICD-10-CM
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
## 2024-08-20 ENCOUNTER — ROUTINE PRENATAL (OUTPATIENT)
Dept: OBGYN CLINIC | Age: 29
End: 2024-08-20

## 2024-08-20 VITALS — WEIGHT: 123 LBS | SYSTOLIC BLOOD PRESSURE: 130 MMHG | DIASTOLIC BLOOD PRESSURE: 82 MMHG | BODY MASS INDEX: 20.47 KG/M2

## 2024-08-20 DIAGNOSIS — O09.899 HISTORY OF PRETERM DELIVERY, CURRENTLY PREGNANT: Primary | ICD-10-CM

## 2024-08-20 DIAGNOSIS — Z12.4 SCREENING FOR CERVICAL CANCER: ICD-10-CM

## 2024-08-20 DIAGNOSIS — Z11.3 SCREEN FOR STD (SEXUALLY TRANSMITTED DISEASE): ICD-10-CM

## 2024-08-20 DIAGNOSIS — Z13.79 GENETIC SCREENING: ICD-10-CM

## 2024-08-20 DIAGNOSIS — O09.91 HIGH-RISK PREGNANCY IN FIRST TRIMESTER: ICD-10-CM

## 2024-08-20 PROCEDURE — 0500F INITIAL PRENATAL CARE VISIT: CPT | Performed by: OBSTETRICS & GYNECOLOGY

## 2024-08-20 ASSESSMENT — ENCOUNTER SYMPTOMS
GASTROINTESTINAL NEGATIVE: 1
RESPIRATORY NEGATIVE: 1

## 2024-08-22 ENCOUNTER — LAB (OUTPATIENT)
Dept: OBGYN CLINIC | Age: 29
End: 2024-08-22

## 2024-08-22 DIAGNOSIS — Z13.79 GENETIC SCREENING: Primary | ICD-10-CM

## 2024-08-22 DIAGNOSIS — O09.91 HIGH-RISK PREGNANCY IN FIRST TRIMESTER: ICD-10-CM

## 2024-08-27 LAB
C TRACH RRNA CVX QL NAA+PROBE: NEGATIVE
COLLECTION METHOD: NORMAL
CYTOLOGIST CVX/VAG CYTO: NORMAL
CYTOLOGY CVX/VAG DOC THIN PREP: NORMAL
HPV APTIMA: NEGATIVE
HPV GENOTYPE REFLEX: NORMAL
Lab: NORMAL
N GONORRHOEA RRNA CVX QL NAA+PROBE: NEGATIVE
OTHER PT INFO: NORMAL
PAP SOURCE: NORMAL
PATH REPORT.FINAL DX SPEC: NORMAL
PREV CYTO INFO: NORMAL
PREV TREATMENT RESULTS: NORMAL
PREV TREATMENT: NORMAL
STAT OF ADQ CVX/VAG CYTO-IMP: NORMAL
T VAGINALIS RRNA SPEC QL NAA+PROBE: NEGATIVE

## 2024-08-30 LAB
Lab: NORMAL
NTRA 22Q11.2 DELETION SYNDROME POPULATION-BASED RISK TEXT: NORMAL
NTRA 22Q11.2 DELETION SYNDROME RESULT TEXT: NORMAL
NTRA 22Q11.2 DELETION SYNDROME RISK SCORE TEXT: NORMAL
NTRA FETAL FRACTION: NORMAL
NTRA FETAL RHD SUMMARY: NORMAL
NTRA GENDER OF FETUS: NORMAL
NTRA MONOSOMY X AGE-BASED RISK TEXT: NORMAL
NTRA MONOSOMY X RESULT TEXT: NORMAL
NTRA MONOSOMY X RISK SCORE TEXT: NORMAL
NTRA TRIPLOIDY RESULT TEXT: NORMAL
NTRA TRISOMY 13 AGE-BASED RISK TEXT: NORMAL
NTRA TRISOMY 13 RESULT TEXT: NORMAL
NTRA TRISOMY 13 RISK SCORE TEXT: NORMAL
NTRA TRISOMY 18 AGE-BASED RISK TEXT: NORMAL
NTRA TRISOMY 18 RESULT TEXT: NORMAL
NTRA TRISOMY 18 RISK SCORE TEXT: NORMAL
NTRA TRISOMY 21 AGE-BASED RISK TEXT: NORMAL
NTRA TRISOMY 21 RESULT TEXT: NORMAL
NTRA TRISOMY 21 RISK SCORE TEXT: NORMAL